# Patient Record
Sex: MALE | Race: BLACK OR AFRICAN AMERICAN | NOT HISPANIC OR LATINO | Employment: OTHER | URBAN - METROPOLITAN AREA
[De-identification: names, ages, dates, MRNs, and addresses within clinical notes are randomized per-mention and may not be internally consistent; named-entity substitution may affect disease eponyms.]

---

## 2019-06-09 ENCOUNTER — HOSPITAL ENCOUNTER (EMERGENCY)
Facility: OTHER | Age: 54
Discharge: HOME | End: 2019-06-09
Attending: EMERGENCY MEDICINE
Payer: MEDICAID

## 2019-06-09 VITALS
BODY MASS INDEX: 49.44 KG/M2 | HEART RATE: 60 BPM | HEIGHT: 67 IN | SYSTOLIC BLOOD PRESSURE: 137 MMHG | OXYGEN SATURATION: 99 % | TEMPERATURE: 99 F | DIASTOLIC BLOOD PRESSURE: 71 MMHG | RESPIRATION RATE: 18 BRPM | WEIGHT: 315 LBS

## 2019-06-09 DIAGNOSIS — R10.9 ABDOMINAL PAIN: ICD-10-CM

## 2019-06-09 DIAGNOSIS — K80.20 CALCULUS OF GALLBLADDER WITHOUT CHOLECYSTITIS WITHOUT OBSTRUCTION: Primary | ICD-10-CM

## 2019-06-09 LAB
ALBUMIN SERPL BCP-MCNC: 3.9 G/DL (ref 3.5–5.2)
ALP SERPL-CCNC: 80 U/L (ref 55–135)
ALT SERPL W/O P-5'-P-CCNC: 17 U/L (ref 10–44)
ANION GAP SERPL CALC-SCNC: 13 MMOL/L (ref 8–16)
AST SERPL-CCNC: 26 U/L (ref 10–40)
BASOPHILS # BLD AUTO: 0.02 K/UL (ref 0–0.2)
BASOPHILS NFR BLD: 0.3 % (ref 0–1.9)
BILIRUB SERPL-MCNC: 0.5 MG/DL (ref 0.1–1)
BILIRUB UR QL STRIP: NEGATIVE
BUN SERPL-MCNC: 29 MG/DL (ref 6–20)
CALCIUM SERPL-MCNC: 9.2 MG/DL (ref 8.7–10.5)
CHLORIDE SERPL-SCNC: 102 MMOL/L (ref 95–110)
CLARITY UR: CLEAR
CO2 SERPL-SCNC: 26 MMOL/L (ref 23–29)
COLOR UR: YELLOW
CREAT SERPL-MCNC: 2.2 MG/DL (ref 0.5–1.4)
DIFFERENTIAL METHOD: ABNORMAL
EOSINOPHIL # BLD AUTO: 0 K/UL (ref 0–0.5)
EOSINOPHIL NFR BLD: 0.5 % (ref 0–8)
ERYTHROCYTE [DISTWIDTH] IN BLOOD BY AUTOMATED COUNT: 15.9 % (ref 11.5–14.5)
EST. GFR  (AFRICAN AMERICAN): 39 ML/MIN/1.73 M^2
EST. GFR  (NON AFRICAN AMERICAN): 33 ML/MIN/1.73 M^2
GLUCOSE SERPL-MCNC: 95 MG/DL (ref 70–110)
GLUCOSE UR QL STRIP: ABNORMAL
HCT VFR BLD AUTO: 40.4 % (ref 40–54)
HGB BLD-MCNC: 13.1 G/DL (ref 14–18)
HGB UR QL STRIP: ABNORMAL
KETONES UR QL STRIP: NEGATIVE
LEUKOCYTE ESTERASE UR QL STRIP: NEGATIVE
LIPASE SERPL-CCNC: 23 U/L (ref 4–60)
LYMPHOCYTES # BLD AUTO: 1.5 K/UL (ref 1–4.8)
LYMPHOCYTES NFR BLD: 24.2 % (ref 18–48)
MCH RBC QN AUTO: 26.3 PG (ref 27–31)
MCHC RBC AUTO-ENTMCNC: 32.4 G/DL (ref 32–36)
MCV RBC AUTO: 81 FL (ref 82–98)
MONOCYTES # BLD AUTO: 0.3 K/UL (ref 0.3–1)
MONOCYTES NFR BLD: 4.5 % (ref 4–15)
NEUTROPHILS # BLD AUTO: 4.4 K/UL (ref 1.8–7.7)
NEUTROPHILS NFR BLD: 70.2 % (ref 38–73)
NITRITE UR QL STRIP: NEGATIVE
PH UR STRIP: 6 [PH] (ref 5–8)
PLATELET # BLD AUTO: 171 K/UL (ref 150–350)
PMV BLD AUTO: 11.5 FL (ref 9.2–12.9)
POTASSIUM SERPL-SCNC: 3.5 MMOL/L (ref 3.5–5.1)
PROT SERPL-MCNC: 8.4 G/DL (ref 6–8.4)
PROT UR QL STRIP: ABNORMAL
RBC # BLD AUTO: 4.98 M/UL (ref 4.6–6.2)
SODIUM SERPL-SCNC: 141 MMOL/L (ref 136–145)
SP GR UR STRIP: 1.01 (ref 1–1.03)
URN SPEC COLLECT METH UR: ABNORMAL
UROBILINOGEN UR STRIP-ACNC: NEGATIVE EU/DL
WBC # BLD AUTO: 6.25 K/UL (ref 3.9–12.7)

## 2019-06-09 PROCEDURE — 83690 ASSAY OF LIPASE: CPT

## 2019-06-09 PROCEDURE — 96372 THER/PROPH/DIAG INJ SC/IM: CPT

## 2019-06-09 PROCEDURE — 81003 URINALYSIS AUTO W/O SCOPE: CPT

## 2019-06-09 PROCEDURE — 63600175 PHARM REV CODE 636 W HCPCS: Performed by: EMERGENCY MEDICINE

## 2019-06-09 PROCEDURE — 85025 COMPLETE CBC W/AUTO DIFF WBC: CPT

## 2019-06-09 PROCEDURE — 80053 COMPREHEN METABOLIC PANEL: CPT

## 2019-06-09 PROCEDURE — 99284 EMERGENCY DEPT VISIT MOD MDM: CPT | Mod: 25

## 2019-06-09 RX ORDER — DICYCLOMINE HYDROCHLORIDE 10 MG/ML
20 INJECTION INTRAMUSCULAR
Status: COMPLETED | OUTPATIENT
Start: 2019-06-09 | End: 2019-06-09

## 2019-06-09 RX ORDER — HYDROCODONE BITARTRATE AND ACETAMINOPHEN 5; 325 MG/1; MG/1
1 TABLET ORAL EVERY 4 HOURS PRN
Qty: 18 TABLET | Refills: 0 | OUTPATIENT
Start: 2019-06-09 | End: 2019-09-02

## 2019-06-09 RX ORDER — DICYCLOMINE HYDROCHLORIDE 20 MG/1
20 TABLET ORAL 2 TIMES DAILY
Qty: 20 TABLET | Refills: 0 | Status: SHIPPED | OUTPATIENT
Start: 2019-06-09 | End: 2019-07-09

## 2019-06-09 RX ADMIN — DICYCLOMINE HYDROCHLORIDE 20 MG: 20 INJECTION, SOLUTION INTRAMUSCULAR at 04:06

## 2019-06-09 NOTE — ED TRIAGE NOTES
"Pt states that he has intermittent generalized abdomen pain that started yesterday. Pt describes pain stabbing and he rate his pain as 7/10. Pt states that he took stool softner this am for "cramps". Pt denies N/V/D, fever and chills. Pt in NAD.   "

## 2019-06-09 NOTE — ED PROVIDER NOTES
Encounter Date: 6/9/2019    SCRIBE #1 NOTE: IVanessa, am scribing for, and in the presence of, Dr. Wood.       History     Chief Complaint   Patient presents with    Abdominal Pain     Pt c/o intermittent generalized abdominal pain which started Friday night, subsided Saturday & returned during the night on Saturday. Pt also reports frequent BMs after taking stool softner 4:30 this am, denies diarrhea. Pt c/o pain worse after drinking fluids, has not eaten solid foods.      Time seen by provider: 3:43 PM    This is a 53 y.o. male who presents to the ED with complaint of bilateral, upper quadrant abdominal pain that began two days ago. Patient reports constant cramping that was not relieved with taking stool softeners. Patient reports pain worsens when laying down. Patient denies having trouble going to the bathroom. Patient denies fever, nausea, vomiting, diarrhea, constipation, or back pain. Patient denies any history of abdominal surgeries. Patient is currently seeing a kidney and heart specialist. Patient just seen kidney specialist and had a normal workup, but his lasix was increased from 40 mg to 80 mg.        Review of patient's allergies indicates:  No Known Allergies  Past Medical History:   Diagnosis Date    CHF (congestive heart failure)     Gout     Hypertension     Renal disorder     Stage 3 chronic kidney disease      History reviewed. No pertinent surgical history.  History reviewed. No pertinent family history.  Social History     Tobacco Use    Smoking status: Never Smoker   Substance Use Topics    Alcohol use: No    Drug use: No     Review of Systems   Constitutional: Negative for fever.   HENT: Negative for sore throat.    Respiratory: Negative for shortness of breath.    Cardiovascular: Negative for chest pain.   Gastrointestinal: Positive for abdominal pain. Negative for constipation, diarrhea, nausea and vomiting.   Genitourinary: Negative for difficulty urinating and dysuria.    Musculoskeletal: Negative for back pain.   Skin: Negative for rash.   Neurological: Negative for weakness.   Hematological: Does not bruise/bleed easily.       Physical Exam     Initial Vitals [06/09/19 1440]   BP Pulse Resp Temp SpO2   (!) 203/92 76 18 98.5 °F (36.9 °C) 97 %      MAP       --         Physical Exam    Nursing note and vitals reviewed.  Constitutional: He appears well-developed and well-nourished. He is not diaphoretic. No distress.   Obese.   HENT:   Head: Normocephalic and atraumatic.   Eyes: Conjunctivae and EOM are normal. No scleral icterus.   Neck: Normal range of motion. Neck supple.   Cardiovascular: Normal rate, regular rhythm and normal heart sounds. Exam reveals no gallop and no friction rub.    No murmur heard.  Pulmonary/Chest: Breath sounds normal. No respiratory distress. He has no wheezes. He has no rhonchi. He has no rales.   Abdominal: Soft. Bowel sounds are normal. He exhibits no distension. There is tenderness in the right upper quadrant. There is no rebound and no guarding.   Musculoskeletal: Normal range of motion. He exhibits edema. He exhibits no tenderness.   Edema in bilateral legs.   Lymphadenopathy:     He has no cervical adenopathy.   Neurological: He is alert and oriented to person, place, and time.   Skin: Skin is warm and dry. No rash noted. No erythema. No pallor.   Psychiatric: He has a normal mood and affect. His behavior is normal. Judgment and thought content normal.         ED Course   Procedures  Labs Reviewed   CBC W/ AUTO DIFFERENTIAL - Abnormal; Notable for the following components:       Result Value    Hemoglobin 13.1 (*)     Mean Corpuscular Volume 81 (*)     Mean Corpuscular Hemoglobin 26.3 (*)     RDW 15.9 (*)     All other components within normal limits   COMPREHENSIVE METABOLIC PANEL - Abnormal; Notable for the following components:    BUN, Bld 29 (*)     Creatinine 2.2 (*)     eGFR if  39 (*)     eGFR if non  33 (*)      All other components within normal limits   URINALYSIS, REFLEX TO URINE CULTURE - Abnormal; Notable for the following components:    Protein, UA Trace (*)     Glucose, UA Trace (*)     Occult Blood UA Trace (*)     All other components within normal limits    Narrative:     Preferred Collection Type->Urine, Clean Catch   LIPASE          Imaging Results          US Abdomen Limited (Gallbladder) (Final result)  Result time 06/09/19 18:24:49    Final result by Theresa Carrera MD (06/09/19 18:24:49)                 Impression:      Cholelithiasis with gallbladder wall thickening, however no convincing ultrasonographic evidence to suggest cholecystitis.      Electronically signed by: Theresa Carrera MD  Date:    06/09/2019  Time:    18:24             Narrative:    EXAMINATION:  US ABDOMEN LIMITED    CLINICAL HISTORY:  Unspecified abdominal pain    TECHNIQUE:  Limited ultrasound of the right upper quadrant of the abdomen was performed.    COMPARISON:  None.    FINDINGS:  Hepatic parenchyma is homogeneous without evidence for masses.  No intra- or extrahepatic biliary ductal dilatation. The common bile duct measures 0.6 cm.  The gallbladder demonstrates 2 non mobile stones, the larger of which measures 0.9 cm.  There is gallbladder wall thickening no evidence of gallbladder wall hyperemia pericholecystic fluid.  Sonographic Wahl's sign is negative.  The visualized portion of the pancreas appears normal.  No ascites.                                 Medical Decision Making:   Initial Assessment:   53-year-old male presents to the emergency department with diffuse upper abdominal pain which is worse on the right side.  The patient reports some worsening after eating or drinking.  Cannot correlate with specific types of food especially cannot correlate with fatty foods.  The patient has been compliant with his medication regimen including recent increased Lasix dosing.  He denies any fever chills. He has some upper  abdominal tenderness worse on the right side.  Differential Diagnosis:   GERD, intestinal spasm, gastroenteritis, gastritis, ulcer, cholecystitis, gallstones, pancreatitis, ileus, small bowel obstruction, appendicitis, constipation, intestinal gas pain.    Clinical Tests:   Lab Tests: Ordered and Reviewed  Radiological Study: Ordered and Reviewed  ED Management:  5:08 PM Patient reevaluated, concerns addressed and updated on status of testing and evaluation.     Patient improved with treatment in the emergency department and comfortable going home. Discussed reasons to return and importance of followup.  Patient understands that the emergency visit today is primarily to address immediate concerns and to rule out emergent cause of symptoms and that they may require further workup and evaluation as an outpatient. All questions addressed and patient given discharge instructions and followup information.  Advised follow-up with General surgery due to cholelithiasis.  There is no evidence of choledocholithiasis or cholecystitis at this time.  The patient was given precautions, verbalizes understanding reasons to return              Scribe Attestation:   Scribe #1: I performed the above scribed service and the documentation accurately describes the services I performed. I attest to the accuracy of the note.    Attending Attestation:           Physician Attestation for Scribe:  Physician Attestation Statement for Scribe #1: I, Dr. Wood, reviewed documentation, as scribed by Vanessa Campbell in my presence, and it is both accurate and complete.                    Clinical Impression:     1. Calculus of gallbladder without cholecystitis without obstruction    2. Abdominal pain                                   Soniya Wood MD  06/14/19 1257

## 2019-09-02 ENCOUNTER — HOSPITAL ENCOUNTER (EMERGENCY)
Facility: OTHER | Age: 54
Discharge: HOME | End: 2019-09-02
Attending: EMERGENCY MEDICINE
Payer: MEDICAID

## 2019-09-02 VITALS
DIASTOLIC BLOOD PRESSURE: 85 MMHG | HEART RATE: 59 BPM | RESPIRATION RATE: 15 BRPM | HEIGHT: 67 IN | BODY MASS INDEX: 49.12 KG/M2 | SYSTOLIC BLOOD PRESSURE: 184 MMHG | OXYGEN SATURATION: 100 % | WEIGHT: 313 LBS | TEMPERATURE: 99 F

## 2019-09-02 DIAGNOSIS — K80.50 BILIARY COLIC: Primary | ICD-10-CM

## 2019-09-02 DIAGNOSIS — N18.30 ACUTE WORSENING OF STAGE 3 CHRONIC KIDNEY DISEASE: Chronic | ICD-10-CM

## 2019-09-02 LAB
ALBUMIN SERPL BCP-MCNC: 3.7 G/DL (ref 3.5–5.2)
ALP SERPL-CCNC: 159 U/L (ref 55–135)
ALT SERPL W/O P-5'-P-CCNC: 232 U/L (ref 10–44)
ANION GAP SERPL CALC-SCNC: 11 MMOL/L (ref 8–16)
AST SERPL-CCNC: 311 U/L (ref 10–40)
BACTERIA #/AREA URNS HPF: ABNORMAL /HPF
BASOPHILS # BLD AUTO: 0.02 K/UL (ref 0–0.2)
BASOPHILS NFR BLD: 0.3 % (ref 0–1.9)
BILIRUB SERPL-MCNC: 1.1 MG/DL (ref 0.1–1)
BILIRUB UR QL STRIP: ABNORMAL
BUN SERPL-MCNC: 30 MG/DL (ref 6–20)
CALCIUM SERPL-MCNC: 9.4 MG/DL (ref 8.7–10.5)
CHLORIDE SERPL-SCNC: 101 MMOL/L (ref 95–110)
CLARITY UR: CLEAR
CO2 SERPL-SCNC: 28 MMOL/L (ref 23–29)
COLOR UR: ABNORMAL
CREAT SERPL-MCNC: 3.2 MG/DL (ref 0.5–1.4)
DIFFERENTIAL METHOD: ABNORMAL
EOSINOPHIL # BLD AUTO: 0 K/UL (ref 0–0.5)
EOSINOPHIL NFR BLD: 0 % (ref 0–8)
ERYTHROCYTE [DISTWIDTH] IN BLOOD BY AUTOMATED COUNT: 15.9 % (ref 11.5–14.5)
EST. GFR  (AFRICAN AMERICAN): 24 ML/MIN/1.73 M^2
EST. GFR  (NON AFRICAN AMERICAN): 21 ML/MIN/1.73 M^2
GLUCOSE SERPL-MCNC: 131 MG/DL (ref 70–110)
GLUCOSE UR QL STRIP: ABNORMAL
HCT VFR BLD AUTO: 38.7 % (ref 40–54)
HGB BLD-MCNC: 12.2 G/DL (ref 14–18)
HGB UR QL STRIP: ABNORMAL
HYALINE CASTS #/AREA URNS LPF: 10 /LPF
IMM GRANULOCYTES # BLD AUTO: 0.01 K/UL (ref 0–0.04)
IMM GRANULOCYTES NFR BLD AUTO: 0.2 % (ref 0–0.5)
KETONES UR QL STRIP: ABNORMAL
LEUKOCYTE ESTERASE UR QL STRIP: NEGATIVE
LIPASE SERPL-CCNC: 31 U/L (ref 4–60)
LYMPHOCYTES # BLD AUTO: 1.1 K/UL (ref 1–4.8)
LYMPHOCYTES NFR BLD: 18.7 % (ref 18–48)
MCH RBC QN AUTO: 26.6 PG (ref 27–31)
MCHC RBC AUTO-ENTMCNC: 31.5 G/DL (ref 32–36)
MCV RBC AUTO: 84 FL (ref 82–98)
MICROSCOPIC COMMENT: ABNORMAL
MONOCYTES # BLD AUTO: 0.3 K/UL (ref 0.3–1)
MONOCYTES NFR BLD: 5.2 % (ref 4–15)
NEUTROPHILS # BLD AUTO: 4.5 K/UL (ref 1.8–7.7)
NEUTROPHILS NFR BLD: 75.6 % (ref 38–73)
NITRITE UR QL STRIP: NEGATIVE
NRBC BLD-RTO: 0 /100 WBC
PH UR STRIP: 6 [PH] (ref 5–8)
PLATELET # BLD AUTO: 167 K/UL (ref 150–350)
PMV BLD AUTO: 11.8 FL (ref 9.2–12.9)
POTASSIUM SERPL-SCNC: 3.3 MMOL/L (ref 3.5–5.1)
PROT SERPL-MCNC: 8 G/DL (ref 6–8.4)
PROT UR QL STRIP: ABNORMAL
RBC # BLD AUTO: 4.59 M/UL (ref 4.6–6.2)
RBC #/AREA URNS HPF: 3 /HPF (ref 0–4)
SODIUM SERPL-SCNC: 140 MMOL/L (ref 136–145)
SP GR UR STRIP: 1.02 (ref 1–1.03)
SQUAMOUS #/AREA URNS HPF: 8 /HPF
URN SPEC COLLECT METH UR: ABNORMAL
UROBILINOGEN UR STRIP-ACNC: ABNORMAL EU/DL
WBC # BLD AUTO: 5.99 K/UL (ref 3.9–12.7)
WBC #/AREA URNS HPF: 3 /HPF (ref 0–5)
WBC CLUMPS URNS QL MICRO: ABNORMAL
YEAST URNS QL MICRO: ABNORMAL

## 2019-09-02 PROCEDURE — 96374 THER/PROPH/DIAG INJ IV PUSH: CPT

## 2019-09-02 PROCEDURE — 99284 EMERGENCY DEPT VISIT MOD MDM: CPT | Mod: 25

## 2019-09-02 PROCEDURE — 80053 COMPREHEN METABOLIC PANEL: CPT

## 2019-09-02 PROCEDURE — 25000003 PHARM REV CODE 250: Performed by: EMERGENCY MEDICINE

## 2019-09-02 PROCEDURE — 63600175 PHARM REV CODE 636 W HCPCS: Performed by: EMERGENCY MEDICINE

## 2019-09-02 PROCEDURE — 81000 URINALYSIS NONAUTO W/SCOPE: CPT

## 2019-09-02 PROCEDURE — 85025 COMPLETE CBC W/AUTO DIFF WBC: CPT

## 2019-09-02 PROCEDURE — 83690 ASSAY OF LIPASE: CPT

## 2019-09-02 RX ORDER — ENALAPRIL MALEATE 20 MG/1
10 TABLET ORAL DAILY
Qty: 30 TABLET | Status: ON HOLD
Start: 2019-09-02 | End: 2022-11-07

## 2019-09-02 RX ORDER — MORPHINE SULFATE 4 MG/ML
4 INJECTION, SOLUTION INTRAMUSCULAR; INTRAVENOUS
Status: COMPLETED | OUTPATIENT
Start: 2019-09-02 | End: 2019-09-02

## 2019-09-02 RX ORDER — FAMOTIDINE 20 MG/1
20 TABLET, FILM COATED ORAL
Status: COMPLETED | OUTPATIENT
Start: 2019-09-02 | End: 2019-09-02

## 2019-09-02 RX ORDER — OXYCODONE AND ACETAMINOPHEN 5; 325 MG/1; MG/1
1 TABLET ORAL EVERY 6 HOURS PRN
Qty: 12 TABLET | Refills: 0 | Status: SHIPPED | OUTPATIENT
Start: 2019-09-02 | End: 2022-06-04

## 2019-09-02 RX ORDER — AMLODIPINE BESYLATE 5 MG/1
5 TABLET ORAL DAILY
Qty: 30 TABLET | Refills: 0
Start: 2019-09-02 | End: 2019-09-02 | Stop reason: SDUPTHER

## 2019-09-02 RX ORDER — DICYCLOMINE HYDROCHLORIDE 10 MG/1
20 CAPSULE ORAL
Status: COMPLETED | OUTPATIENT
Start: 2019-09-02 | End: 2019-09-02

## 2019-09-02 RX ORDER — AMLODIPINE BESYLATE 5 MG/1
5 TABLET ORAL DAILY
Qty: 30 TABLET | Refills: 0 | Status: ON HOLD | OUTPATIENT
Start: 2019-09-02 | End: 2022-11-07

## 2019-09-02 RX ADMIN — DICYCLOMINE HYDROCHLORIDE 20 MG: 10 CAPSULE ORAL at 01:09

## 2019-09-02 RX ADMIN — MORPHINE SULFATE 4 MG: 4 INJECTION INTRAVENOUS at 01:09

## 2019-09-02 RX ADMIN — FAMOTIDINE 20 MG: 20 TABLET, FILM COATED ORAL at 01:09

## 2019-09-02 NOTE — ED TRIAGE NOTES
Pt presents to ED with c/o upper abdominal pain. Reports it as an intermittent sharp stabbing pain. States he was seen previously for this in June and was told it was his gallbladder, but didn't follow up because the pain went away. Denies N/V/D, fever, chest pain, dysuria, and SOB

## 2019-09-02 NOTE — ED PROVIDER NOTES
Encounter Date: 9/2/2019    SCRIBE #1 NOTE: I, Link Huerta, am scribing for, and in the presence of, Dr. Woodard.       History     Chief Complaint   Patient presents with    Abdominal Pain     seen here in June and supposed to follow up with surgery to have gallbladder removed, but felt better so did not follow up, now he is hurting and would like to have it taken out now     Time seen by provider: 1:26 AM    This is a 54 y.o. male with a history of CHF, HTN, and CKD who presents with complaint of upper abdominal pain that began approximately three days ago. The pain is described as an intermittent sharp pain. The patient was evaluated on 06/09/19 and was diagnosed with gallstones and was told to follow up with general surgery. The patient has had three follow up appointments with general surgery, but the patient has not had surgrery yet secondary to the pain going away. Last night the pain began a few hours after eating a tuna fish sandwich and is more constant now. He denies fever, chills, sore throat, chest pain, shortness of breath, nausea, vomiting, and dysuria. He states that his cardiologist even signed off on him having surgery and to stop taking his Eliquis for two days.  He is compliant with all of his medication, but he didn't take his fluid pills secondary to his symptoms.     The history is provided by the patient.     Review of patient's allergies indicates:  No Known Allergies  Past Medical History:   Diagnosis Date    CHF (congestive heart failure)     Gout     Hypertension     Renal disorder     Stage 3 chronic kidney disease      History reviewed. No pertinent surgical history.  History reviewed. No pertinent family history.  Social History     Tobacco Use    Smoking status: Never Smoker   Substance Use Topics    Alcohol use: No    Drug use: No     Review of Systems   Constitutional: Negative for chills and fever.   HENT: Negative for sore throat.    Eyes: Negative for redness.    Respiratory: Negative for shortness of breath.    Cardiovascular: Negative for chest pain.   Gastrointestinal: Positive for abdominal pain (sharp).   Genitourinary: Negative for dysuria.   Skin: Negative for rash.   Neurological: Negative for headaches.   Psychiatric/Behavioral: Negative for confusion.       Physical Exam     Initial Vitals [09/02/19 0109]   BP Pulse Resp Temp SpO2   (!) 185/87 76 18 98.5 °F (36.9 °C) 98 %      MAP       --         Physical Exam    Nursing note and vitals reviewed.  Constitutional: He appears well-developed and well-nourished.   Obese.    HENT:   Head: Normocephalic and atraumatic.   Mouth/Throat: Oropharynx is clear and moist.   Eyes: Conjunctivae and EOM are normal. Pupils are equal, round, and reactive to light.   Neck: Normal range of motion. Neck supple.   Cardiovascular: Normal rate, normal heart sounds and normal pulses. An irregularly irregular rhythm present.    No murmur heard.  Pulmonary/Chest: Breath sounds normal. No respiratory distress. He has no wheezes. He has no rhonchi. He has no rales.   Abdominal: Soft. There is tenderness in the right upper quadrant and epigastric area. There is no rebound and no guarding.   Musculoskeletal: Normal range of motion. He exhibits edema. He exhibits no tenderness.   Chronic 1+ edema.    Neurological: He is alert and oriented to person, place, and time. He has normal strength. No cranial nerve deficit.   Skin: Skin is warm and dry.   Psychiatric: He has a normal mood and affect. His behavior is normal. Thought content normal.         ED Course   Procedures  Labs Reviewed   COMPREHENSIVE METABOLIC PANEL - Abnormal; Notable for the following components:       Result Value    Potassium 3.3 (*)     Glucose 131 (*)     BUN, Bld 30 (*)     Creatinine 3.2 (*)     Total Bilirubin 1.1 (*)     Alkaline Phosphatase 159 (*)      (*)      (*)     eGFR if  24 (*)     eGFR if non  21 (*)     All  other components within normal limits   CBC W/ AUTO DIFFERENTIAL - Abnormal; Notable for the following components:    RBC 4.59 (*)     Hemoglobin 12.2 (*)     Hematocrit 38.7 (*)     Mean Corpuscular Hemoglobin 26.6 (*)     Mean Corpuscular Hemoglobin Conc 31.5 (*)     RDW 15.9 (*)     Gran% 75.6 (*)     All other components within normal limits   URINALYSIS, REFLEX TO URINE CULTURE - Abnormal; Notable for the following components:    Protein, UA 2+ (*)     Glucose, UA Trace (*)     Ketones, UA Trace (*)     Bilirubin (UA) 1+ (*)     Occult Blood UA Trace (*)     Urobilinogen, UA 4.0-6.0 (*)     All other components within normal limits    Narrative:     Preferred Collection Type->Urine, Clean Catch   URINALYSIS MICROSCOPIC - Abnormal; Notable for the following components:    Hyaline Casts, UA 10 (*)     All other components within normal limits    Narrative:     Preferred Collection Type->Urine, Clean Catch   LIPASE          Imaging Results          US Abdomen Limited (Final result)  Result time 09/02/19 02:55:29    Final result by Porsha Jenkins MD (09/02/19 02:55:29)                 Impression:      1. Cholelithiasis with slight gallbladder wall thickening.  No definite sonographic evidence of acute cholecystitis at this time.  However, further evaluation with HIDA scan can be performed as clinically warranted.  2. Findings suggestive of hepatic steatosis.  Correlation with LFTs advised.      Electronically signed by: Porsha Jenkins MD  Date:    09/02/2019  Time:    02:55             Narrative:    EXAMINATION:  US ABDOMEN LIMITED    CLINICAL HISTORY:  RUQ pain;    TECHNIQUE:  Limited ultrasound of the right upper quadrant of the abdomen (including pancreas, liver, gallbladder, common bile duct, and right kidney) was performed.    COMPARISON:  Abdominal ultrasound 06/09/2019    FINDINGS:  Please note this is a technically limited examination secondary to bowel gas artifact and patient body habitus.    Pancreas:  Obscured by overlying bowel gas.    IVC: Unremarkable in its visualized extent    Liver: The liver measures 15.7 cm.  There is diffuse attenuation of sound by the liver consistent with a diffuse parenchymal process such as fatty infiltration.  No focal hepatic abnormality.    Gallbladder: The gallbladder is contracted and contains multiple stones, the largest of which measures 1.4 cm.  The gallbladder wall is slightly thickened measuring 0.4 cm.  Sonographic Wahl sign is reported to be negative by the ultrasound technologist.    Biliary system: The common duct is not dilated, measuring 3 mm.  No intrahepatic ductal dilatation.    Spleen: The spleen is normal in size measuring 10 cm.    Miscellaneous: No upper abdominal ascites.                                 Medical Decision Making:   Initial Assessment:       54-year-old male with history of CHF, HTN, CKD, AFib presents with recurrent upper abdominal pain that started 3 days ago and became more constant since eating dinner last night.  He had similar pain when seen here almost 3 months ago and diagnosed with biliary colic.  He has seen a surgeon Dr. Crowley since then and was advised to have surgery, and got clearance from his cardiologist to stop Eliquis for 2 days for surgery, but did not proceed since symptoms stopped in the past month.  Pain is described as sharp epigastric and right upper quadrant pain, usually about an hour after eating.  He ran out of pain meds Rx on last ED visit.  No associated nausea, fevers, or other new complaints.   On exam patient with epigastric and right upper quadrant tenderness but negative Wahl's, no fever and no other concerning findings.  He has chronic lower extremity edema that he states is improved from baseline; he has not taking Lasix yet today but no sign of CHF exacerbation on exam.       Initial labs concerning for elevated LFTs that were not present 3 months ago, with TB 1.1 and  and .  He also has  acute on chronic kidney injury with CR 3.2 with previous baseline 2.2.  No elevated WBC. Right upper quadrant ultrasound shows multiple gallstones, largest 1.4 cm, with slight gallbladder wall thickening but no CBD dilation and negative sonographic Wahl sign; findings not definite for acute cholecystitis.  Patient also has fatty liver, which may be related to elevated LFTs, since no sign of biliary obstruction.  On last cardiology visit his Norvasc was stopped due to side effect of lower extremity edema, and enalapril double 10 mg to 20 mg.  Discussed with Dr. Pratt, covering for his nephrologist Dr. Hogan, who believes this increase in enalapril is most likely responsible for worsening CKD, unless another etiology like fluid overload, dehydration, or sepsis present.  No evidence of CHF, and patient states he has been eating normally despite pain, and no sign of acute cholecystitis.  I advised admission for observation of pain to ensure no developing acute cholecystitis, and renal function after decreasing enalapril back to 10 mg daily. After IV pain control he feels improved, and wants admission only if his gallbladder will be taken out during admission.  Discussed with Dr. Head, surgery on-call, states that patient will likely need optimization of renal function and be off of Eliquis for 2 days prior to any possible cholecystectomy.  He has appointment with Nephrology and 3 days and with his surgeon in 4 days, and prefers trial of outpatient management.  I had extensive discussion with him about risks of outpatient management and he states he understands and will return for any worsening symptoms. Will discharge with pain meds, and patient advised to keep appointments and return to the ED for any worsening pain or other new symptoms such as fevers, SOB, CP,, vomiting, or any other concerns.      Clinical Tests:   Lab Tests: Ordered and Reviewed  Radiological Study: Ordered and Reviewed            Scribe  Attestation:   Scribe #1: I performed the above scribed service and the documentation accurately describes the services I performed. I attest to the accuracy of the note.    Attending Attestation:           Physician Attestation for Scribe:  Physician Attestation Statement for Scribe #1: I, Dr. Woodard, reviewed documentation, as scribed by Link Huerta  in my presence, and it is both accurate and complete.                    Clinical Impression:     1. Biliary colic    2. Acute worsening of stage 3 chronic kidney disease                                   Asa Woodard MD  09/02/19 2013

## 2022-06-04 ENCOUNTER — HOSPITAL ENCOUNTER (EMERGENCY)
Facility: OTHER | Age: 57
Discharge: HOME | End: 2022-06-04
Attending: EMERGENCY MEDICINE
Payer: MEDICAID

## 2022-06-04 VITALS
BODY MASS INDEX: 42.53 KG/M2 | DIASTOLIC BLOOD PRESSURE: 83 MMHG | OXYGEN SATURATION: 100 % | WEIGHT: 271 LBS | RESPIRATION RATE: 18 BRPM | SYSTOLIC BLOOD PRESSURE: 145 MMHG | HEART RATE: 79 BPM | TEMPERATURE: 99 F | HEIGHT: 67 IN

## 2022-06-04 DIAGNOSIS — M25.569 KNEE PAIN: ICD-10-CM

## 2022-06-04 DIAGNOSIS — M71.162 SEPTIC PREPATELLAR BURSITIS OF LEFT KNEE: Primary | ICD-10-CM

## 2022-06-04 PROCEDURE — 25000003 PHARM REV CODE 250: Performed by: PHYSICIAN ASSISTANT

## 2022-06-04 PROCEDURE — 99283 EMERGENCY DEPT VISIT LOW MDM: CPT | Mod: 25

## 2022-06-04 RX ORDER — CEPHALEXIN 500 MG/1
500 CAPSULE ORAL EVERY 6 HOURS
Qty: 28 CAPSULE | Refills: 0 | Status: SHIPPED | OUTPATIENT
Start: 2022-06-04 | End: 2022-06-11

## 2022-06-04 RX ORDER — HYDROCODONE BITARTRATE AND ACETAMINOPHEN 5; 325 MG/1; MG/1
1 TABLET ORAL
Status: COMPLETED | OUTPATIENT
Start: 2022-06-04 | End: 2022-06-04

## 2022-06-04 RX ORDER — LIDOCAINE HYDROCHLORIDE 20 MG/ML
5 INJECTION, SOLUTION INFILTRATION; PERINEURAL
Status: COMPLETED | OUTPATIENT
Start: 2022-06-04 | End: 2022-06-04

## 2022-06-04 RX ORDER — HYDROCODONE BITARTRATE AND ACETAMINOPHEN 5; 325 MG/1; MG/1
1 TABLET ORAL EVERY 4 HOURS PRN
Qty: 8 TABLET | Refills: 0 | Status: SHIPPED | OUTPATIENT
Start: 2022-06-04 | End: 2022-06-06

## 2022-06-04 RX ORDER — HYDROCODONE BITARTRATE AND ACETAMINOPHEN 5; 325 MG/1; MG/1
1 TABLET ORAL
Status: DISCONTINUED | OUTPATIENT
Start: 2022-06-04 | End: 2022-06-04

## 2022-06-04 RX ORDER — CEPHALEXIN 500 MG/1
500 CAPSULE ORAL
Status: COMPLETED | OUTPATIENT
Start: 2022-06-04 | End: 2022-06-04

## 2022-06-04 RX ADMIN — HYDROCODONE BITARTRATE AND ACETAMINOPHEN 1 TABLET: 5; 325 TABLET ORAL at 07:06

## 2022-06-04 RX ADMIN — CEPHALEXIN 500 MG: 500 CAPSULE ORAL at 07:06

## 2022-06-04 RX ADMIN — LIDOCAINE HYDROCHLORIDE 5 ML: 20 INJECTION, SOLUTION INFILTRATION; PERINEURAL at 05:06

## 2022-06-04 NOTE — ED TRIAGE NOTES
Chief Complaint   Patient presents with    Knee Pain     Pt to ER with c/o left knee pain and swelling. Pt reports tripped and fell on Friday and seen at [facility] ER and was told nothing was broken. Pt reports swelling still there and pain has not resolved.      Pt presents to ED after a fall on 6/3. Pt was seen at [facility] ER and was told nothing was broken. Pt reports pain and swelling has not resolved with RICE. Left knee is slightly larger than right knee and visibly swollen.

## 2022-06-04 NOTE — ED PROVIDER NOTES
Encounter Date: 6/4/2022       History     Chief Complaint   Patient presents with    Knee Pain     Pt to ER with c/o left knee pain and swelling. Pt reports tripped and fell on Friday and seen at [facility] ER and was told nothing was broken. Pt reports swelling still there and pain has not resolved.      Patient presents with complaints of left knee pain and swelling. He reports that he tripped and fell one week ago and fell onto his left knee. He was seen at [facility] ER and was told there were no fractures or dislocations. He was discharged with instruction to wear an ACE, elevated, apply cool compresses. He reports that he has been doing this but admits that the keen continued to swell and is more painful than before. He is able to walk but admits that there is a throbbing sensation just below the left knee. There are no open wounds.   Of note, he is taking apixaban daily.         Review of patient's allergies indicates:  No Known Allergies  Past Medical History:   Diagnosis Date    CHF (congestive heart failure)     Gout     Hypertension     Renal disorder     Stage 3 chronic kidney disease      No past surgical history on file.  No family history on file.  Social History     Tobacco Use    Smoking status: Never Smoker   Substance Use Topics    Alcohol use: No    Drug use: No     Review of Systems   Constitutional: Negative for chills and fever.   HENT: Negative for sore throat and trouble swallowing.    Eyes: Negative for visual disturbance.   Respiratory: Negative for cough and shortness of breath.    Cardiovascular: Negative for chest pain.   Gastrointestinal: Negative for abdominal pain, constipation, diarrhea, nausea and vomiting.   Genitourinary: Negative for dysuria and flank pain.   Musculoskeletal: Negative for back pain, neck pain and neck stiffness.        Left knee pain and swelling    Skin: Negative for rash.   Neurological: Negative for dizziness, syncope, weakness and headaches.    Psychiatric/Behavioral: Negative for confusion.       Physical Exam     Initial Vitals [06/04/22 1456]   BP Pulse Resp Temp SpO2   (!) 143/91 75 18 98.6 °F (37 °C) 100 %      MAP       --         Physical Exam    Nursing note and vitals reviewed.  Constitutional:   Elderly male in NAD or apparent pain. He makes good eye contact, speaks in clear full sentences and ambulates with mildly antalgic gait.    HENT:   Head: Normocephalic and atraumatic.   Eyes: EOM are normal. Pupils are equal, round, and reactive to light.   Neck:   Normal range of motion.  Pulmonary/Chest: No respiratory distress.   Abdominal: Abdomen is soft.   Musculoskeletal:         General: Tenderness and edema present. Normal range of motion.      Cervical back: Normal range of motion.      Comments: Left knee is warm to touch, edematous and has significant ecchymosis noted to both thigh and calf. There is no thigh or calf TTP. There is only TTP to the prepatellar bursa.      Neurological: He is alert and oriented to person, place, and time. He has normal strength.   Skin: Skin is warm and dry. Capillary refill takes less than 2 seconds.   Psychiatric: He has a normal mood and affect. His behavior is normal. Thought content normal.         ED Course   Procedures  Labs Reviewed - No data to display       Imaging Results          X-Ray Knee 1 or 2 View Left (Final result)  Result time 06/04/22 16:29:00    Final result by Carmine Moura MD (06/04/22 16:29:00)                 Impression:      No evidence of fracture.Prepatellar soft tissue swelling may be that of hematoma or prepatellar bursitis.      Electronically signed by: Carmine Moura MD  Date:    06/04/2022  Time:    16:29             Narrative:    EXAMINATION:  XR KNEE 1 OR 2 VIEW LEFT    CLINICAL HISTORY:  Pain in unspecified knee    COMPARISON:  None.    FINDINGS:  The alignment is within normal limits.  No displaced fractures identified.  No evidence of lytic or blastic lesions.Joint  spaces are unremarkable.Prepatellar soft tissue swelling potentially hematoma or prepatellar bursitis.                                 Medications   LIDOcaine HCL 20 mg/ml (2%) injection 5 mL (5 mLs Intradermal Given by Other 6/4/22 1746)   cephALEXin capsule 500 mg (500 mg Oral Given 6/4/22 1945)   HYDROcodone-acetaminophen 5-325 mg per tablet 1 tablet (1 tablet Oral Given 6/4/22 1945)     Medical Decision Making:   ED Management:  Urgent evaluation a 56-year-old male who presents with complaints most consistent with left-sided septic bursitis.  He is afebrile, nontoxic appearing, hemodynamically stable.  Physical exam outlined above and reveals exquisitely tender and swollen prepatellar bursa on the left lower extremity with no open wounds.  There is associated ecchymosis and warmth to touch.  There is good range of motion so low suspicion for intra-articular infection.  X-ray does confirm possible hematoma in the prepatellar bursa.  Given its warmth, tenderness and erythema I attempted to aspirate fluid from the bursa sac.  Was unable to obtain any significant volume of fluid only scant dark red blood expect that sac is filled with clotted blood product.  No specimens obtained for analysis.  Will place patient on Keflex empirically 1st dose given here in the emergency department.  Knee is wrapped with Ace wrap and patient is encouraged to return to this emergency department in 2 days for wound recheck.  He is educated on the importance of follow-up with ortho as well as in this department.  He is also where the importance of antibiotic compliance.  He verbalizes understanding of plan as well as ED return precautions.  He is stable for discharge.                      Clinical Impression:   Final diagnoses:  [M25.569] Knee pain  [M71.162] Septic prepatellar bursitis of left knee (Primary)          ED Disposition Condition    Discharge Good        ED Prescriptions     Medication Sig Dispense Start Date End Date  Auth. Provider    cephALEXin (KEFLEX) 500 MG capsule Take 1 capsule (500 mg total) by mouth every 6 (six) hours. for 7 days 28 capsule 6/4/2022 6/11/2022 Jackie Weinberg PA-C    HYDROcodone-acetaminophen (NORCO) 5-325 mg per tablet Take 1 tablet by mouth every 4 (four) hours as needed for Pain. 8 tablet 6/4/2022 6/6/2022 Jackie Weinberg PA-C        Follow-up Information     Follow up With Specialties Details Why Contact Info    [facility] - Emergency Dept Emergency Medicine In 2 days For symptom re-check [address] [facility] [id number] [phone]    Daniel Grewal MD Orthopedic Surgery In 2 days For wound re-check [address] [facility] [address]  [facility] [address]  [phone]             Jackie Weinberg PA-C  06/04/22 6005

## 2022-06-06 ENCOUNTER — HOSPITAL ENCOUNTER (EMERGENCY)
Facility: OTHER | Age: 57
Discharge: HOME | End: 2022-06-06
Attending: EMERGENCY MEDICINE
Payer: MEDICAID

## 2022-06-06 VITALS
WEIGHT: 271 LBS | SYSTOLIC BLOOD PRESSURE: 120 MMHG | HEART RATE: 80 BPM | RESPIRATION RATE: 18 BRPM | HEIGHT: 67 IN | OXYGEN SATURATION: 100 % | TEMPERATURE: 98 F | DIASTOLIC BLOOD PRESSURE: 70 MMHG | BODY MASS INDEX: 42.53 KG/M2

## 2022-06-06 DIAGNOSIS — M70.42 PREPATELLAR BURSITIS, LEFT KNEE: Primary | ICD-10-CM

## 2022-06-06 DIAGNOSIS — Z79.01 ON CONTINUOUS ORAL ANTICOAGULATION: ICD-10-CM

## 2022-06-06 DIAGNOSIS — S80.12XA TRAUMATIC ECCHYMOSIS OF LEFT LOWER LEG, INITIAL ENCOUNTER: ICD-10-CM

## 2022-06-06 PROCEDURE — 99283 EMERGENCY DEPT VISIT LOW MDM: CPT

## 2022-06-06 PROCEDURE — 25000003 PHARM REV CODE 250: Performed by: EMERGENCY MEDICINE

## 2022-06-06 RX ORDER — OXYCODONE AND ACETAMINOPHEN 5; 325 MG/1; MG/1
1 TABLET ORAL EVERY 4 HOURS PRN
Qty: 14 TABLET | Refills: 0 | Status: ON HOLD | OUTPATIENT
Start: 2022-06-06 | End: 2022-11-07

## 2022-06-06 RX ORDER — OXYCODONE AND ACETAMINOPHEN 5; 325 MG/1; MG/1
2 TABLET ORAL
Status: COMPLETED | OUTPATIENT
Start: 2022-06-06 | End: 2022-06-06

## 2022-06-06 RX ADMIN — OXYCODONE HYDROCHLORIDE AND ACETAMINOPHEN 2 TABLET: 5; 325 TABLET ORAL at 07:06

## 2022-06-06 NOTE — FIRST PROVIDER EVALUATION
"Medical screening exam completed.  I have conducted a focused provider triage encounter, findings are as follows:    Brief history of present illness:  Diagnosed with septic prepatellar bursitis two days ago. Sent home on pain medication and antibiotics. Here for recheck. Reports pain with ambulation. Ortho follow up scheduled for Friday.     Vitals:    06/06/22 1534   BP: 129/65   BP Location: Left arm   Patient Position: Sitting   Pulse: 90   Resp: 18   Temp: 98.1 °F (36.7 °C)   TempSrc: Oral   SpO2: 98%   Weight: 122.9 kg (271 lb)   Height: 5' 7" (1.702 m)       Pertinent physical exam:  Swelling with dressing noted to the L knee.     Brief workup plan:  Defer to provider once roomed.     Preliminary workup initiated; this workup will be continued and followed by the physician or advanced practice provider that is assigned to the patient when roomed.  "

## 2022-06-06 NOTE — ED PROVIDER NOTES
Encounter Date: 6/6/2022    SCRIBE #1 NOTE: I, Brianne Laboy, am scribing for, and in the presence of, Eliu Carrera II, MD.       History     Chief Complaint   Patient presents with    recheck knee     Pt was told to come back to recheck left knee. Pt was seen here Saturday for septic joint. Pt has swelling noted to left knee. AAO x 3 nadn skin w.d  pt has appt with ortho on Friday      Time seen by provider: 6:48 PM    This is a 56 y.o. male who presents per doctor's orders to get his left knee checked for signs of sepsis. The patient was seen on 6/4/2022 for left knee pain and swelling and reports he is still experiencing the same pain. He reports taking the antibiotics as prescribed but feeling no different. He notes ecchymosis around left knee. The patient describes the pain medication was effective, though he has a small amount. Of note the patient is on Eliquis. He states he has an appointment with his orthopedic doctor on 6/10/2022.    The history is provided by the patient.     Review of patient's allergies indicates:  No Known Allergies  Past Medical History:   Diagnosis Date    CHF (congestive heart failure)     Gout     Hypertension     Renal disorder     Stage 3 chronic kidney disease      History reviewed. No pertinent surgical history.  History reviewed. No pertinent family history.  Social History     Tobacco Use    Smoking status: Never Smoker   Substance Use Topics    Alcohol use: No    Drug use: No     Review of Systems   Constitutional: Negative for fever.   HENT: Negative for sore throat.    Respiratory: Negative for shortness of breath.    Cardiovascular: Negative for chest pain.   Gastrointestinal: Negative for nausea.   Genitourinary: Negative for dysuria.   Musculoskeletal: Negative for back pain.        Positive for left knee pain.   Skin: Negative for rash.        Positive for ecchymosis around left knee.   Neurological: Negative for weakness.   Hematological: Does not  bruise/bleed easily.       Physical Exam     Initial Vitals [06/06/22 1534]   BP Pulse Resp Temp SpO2   129/65 90 18 98.1 °F (36.7 °C) 98 %      MAP       --         Physical Exam    Nursing note and vitals reviewed.  Constitutional: He appears well-developed and well-nourished. He is not diaphoretic. No distress.   HENT:   Head: Normocephalic and atraumatic.   Eyes: EOM are normal.   Neck:   Normal range of motion.  Musculoskeletal:         General: Normal range of motion.      Cervical back: Normal range of motion.      Comments: Left knee with continued ecchymosis wrapping around his thigh which extends proximally and distally. Similar in distribution to prior exam. Warm but no erythema over patella. Tender prepatellar swelling. Pain with but able to perform near normal ROM. NVID.     Neurological: He is alert and oriented to person, place, and time.   Skin: Skin is dry.         ED Course   Procedures  Labs Reviewed - No data to display       Imaging Results    None          Medications   oxyCODONE-acetaminophen 5-325 mg per tablet 2 tablet (2 tablets Oral Given 6/6/22 1931)     Medical Decision Making:   History:   Old Medical Records: I decided to obtain old medical records.  pt returns for wound check of prepatellar bursitis.  Extent of ecchymosis is similar to my prior eval.  Remains warm w/out erythema.  Pain is only limited at full flexion, likely due to the bursitis. Clinical picture and exam don't suggest septic jt. Continue abx.  Reports little reliev w/ vicodin - will chage to percocet.  Has f/u appt scheduled for 4d from now w/ dr nessa Jameson Attestation:   Scribe #1: I performed the above scribed service and the documentation accurately describes the services I performed. I attest to the accuracy of the note.               Physician Attestation for Scribe: I, TLH, reviewed documentation as scribed in my presence, which is both accurate and complete.  Clinical Impression:   Final  diagnoses:  [M70.42] Prepatellar bursitis, left knee (Primary)  [S80.12XA] Traumatic ecchymosis of left lower leg, initial encounter  [Z79.01] On continuous oral anticoagulation          ED Disposition Condition    Discharge Stable        ED Prescriptions     Medication Sig Dispense Start Date End Date Auth. Provider    oxyCODONE-acetaminophen (PERCOCET) 5-325 mg per tablet Take 1 tablet by mouth every 4 (four) hours as needed for Pain. 14 tablet 6/6/2022  Eliu Carrera II, MD        Follow-up Information     Follow up With Specialties Details Why Contact Info    Daniel Grewal MD Orthopedic Surgery In 4 days as scheduled [address] [facility]  [address]  [facility] [address]  [phone]             Eliu Carrera II, MD  06/07/22 6994

## 2022-11-07 ENCOUNTER — HOSPITAL ENCOUNTER (INPATIENT)
Facility: OTHER | Age: 57
LOS: 2 days | Discharge: HOME | DRG: 683 | End: 2022-11-09
Attending: EMERGENCY MEDICINE | Admitting: HOSPITALIST
Payer: MEDICAID

## 2022-11-07 DIAGNOSIS — I48.91 ATRIAL FIBRILLATION WITH RVR: ICD-10-CM

## 2022-11-07 DIAGNOSIS — I50.9 CONGESTIVE HEART FAILURE, UNSPECIFIED: ICD-10-CM

## 2022-11-07 DIAGNOSIS — R07.9 CHEST PAIN: ICD-10-CM

## 2022-11-07 DIAGNOSIS — M10.9 GOUT: ICD-10-CM

## 2022-11-07 DIAGNOSIS — R06.02 SOB (SHORTNESS OF BREATH): ICD-10-CM

## 2022-11-07 DIAGNOSIS — E87.6 HYPOKALEMIA: ICD-10-CM

## 2022-11-07 DIAGNOSIS — I50.42: ICD-10-CM

## 2022-11-07 DIAGNOSIS — I50.43: ICD-10-CM

## 2022-11-07 DIAGNOSIS — I50.9 CHF EXACERBATION: ICD-10-CM

## 2022-11-07 DIAGNOSIS — I50.43 ACUTE ON CHRONIC COMBINED SYSTOLIC AND DIASTOLIC HEART FAILURE, NYHA CLASS 2: ICD-10-CM

## 2022-11-07 DIAGNOSIS — I10 HTN (HYPERTENSION): ICD-10-CM

## 2022-11-07 DIAGNOSIS — N18.30: ICD-10-CM

## 2022-11-07 DIAGNOSIS — I50.21: ICD-10-CM

## 2022-11-07 DIAGNOSIS — N17.9 AKI (ACUTE KIDNEY INJURY): Primary | ICD-10-CM

## 2022-11-07 PROBLEM — D63.1 ANEMIA OF CHRONIC KIDNEY FAILURE, STAGE 4 (SEVERE): Status: ACTIVE | Noted: 2020-03-11

## 2022-11-07 PROBLEM — Z79.01 CHRONIC ANTICOAGULATION: Status: ACTIVE | Noted: 2020-02-21

## 2022-11-07 PROBLEM — N18.4 ANEMIA OF CHRONIC KIDNEY FAILURE, STAGE 4 (SEVERE): Status: ACTIVE | Noted: 2020-03-11

## 2022-11-07 PROBLEM — R60.0 EDEMA OF BOTH LEGS: Status: ACTIVE | Noted: 2020-02-22

## 2022-11-07 LAB
ALBUMIN SERPL BCP-MCNC: 3.6 G/DL (ref 3.5–5.2)
ALP SERPL-CCNC: 87 U/L (ref 55–135)
ALT SERPL W/O P-5'-P-CCNC: 16 U/L (ref 10–44)
ANION GAP SERPL CALC-SCNC: 12 MMOL/L (ref 8–16)
ANION GAP SERPL CALC-SCNC: 13 MMOL/L (ref 8–16)
ANION GAP SERPL CALC-SCNC: 15 MMOL/L (ref 8–16)
ANISOCYTOSIS BLD QL SMEAR: SLIGHT
AST SERPL-CCNC: 16 U/L (ref 10–40)
BASOPHILS # BLD AUTO: 0.04 K/UL (ref 0–0.2)
BASOPHILS NFR BLD: 0.9 % (ref 0–1.9)
BILIRUB SERPL-MCNC: 0.9 MG/DL (ref 0.1–1)
BILIRUB UR QL STRIP: NEGATIVE
BUN SERPL-MCNC: 47 MG/DL (ref 6–20)
BUN SERPL-MCNC: 48 MG/DL (ref 6–20)
BUN SERPL-MCNC: 50 MG/DL (ref 6–20)
CALCIUM SERPL-MCNC: 9.2 MG/DL (ref 8.7–10.5)
CALCIUM SERPL-MCNC: 9.4 MG/DL (ref 8.7–10.5)
CALCIUM SERPL-MCNC: 9.8 MG/DL (ref 8.7–10.5)
CHLORIDE SERPL-SCNC: 93 MMOL/L (ref 95–110)
CHLORIDE SERPL-SCNC: 94 MMOL/L (ref 95–110)
CHLORIDE SERPL-SCNC: 96 MMOL/L (ref 95–110)
CLARITY UR: CLEAR
CO2 SERPL-SCNC: 34 MMOL/L (ref 23–29)
CO2 SERPL-SCNC: 34 MMOL/L (ref 23–29)
CO2 SERPL-SCNC: 36 MMOL/L (ref 23–29)
COLOR UR: YELLOW
CREAT SERPL-MCNC: 3.4 MG/DL (ref 0.5–1.4)
CREAT SERPL-MCNC: 3.5 MG/DL (ref 0.5–1.4)
CREAT SERPL-MCNC: 3.7 MG/DL (ref 0.5–1.4)
CTP QC/QA: YES
DACRYOCYTES BLD QL SMEAR: ABNORMAL
DIFFERENTIAL METHOD: ABNORMAL
EOSINOPHIL # BLD AUTO: 0 K/UL (ref 0–0.5)
EOSINOPHIL NFR BLD: 0 % (ref 0–8)
ERYTHROCYTE [DISTWIDTH] IN BLOOD BY AUTOMATED COUNT: 19.2 % (ref 11.5–14.5)
EST. GFR  (NO RACE VARIABLE): 18 ML/MIN/1.73 M^2
EST. GFR  (NO RACE VARIABLE): 20 ML/MIN/1.73 M^2
EST. GFR  (NO RACE VARIABLE): 20 ML/MIN/1.73 M^2
GLUCOSE SERPL-MCNC: 80 MG/DL (ref 70–110)
GLUCOSE SERPL-MCNC: 82 MG/DL (ref 70–110)
GLUCOSE SERPL-MCNC: 89 MG/DL (ref 70–110)
GLUCOSE UR QL STRIP: NEGATIVE
HCT VFR BLD AUTO: 36.1 % (ref 40–54)
HGB BLD-MCNC: 11.7 G/DL (ref 14–18)
HGB UR QL STRIP: ABNORMAL
HIV 1+2 AB+HIV1 P24 AG SERPL QL IA: NEGATIVE
HYPOCHROMIA BLD QL SMEAR: ABNORMAL
IMM GRANULOCYTES # BLD AUTO: 0.01 K/UL (ref 0–0.04)
IMM GRANULOCYTES NFR BLD AUTO: 0.2 % (ref 0–0.5)
KETONES UR QL STRIP: NEGATIVE
LEUKOCYTE ESTERASE UR QL STRIP: NEGATIVE
LIPASE SERPL-CCNC: 34 U/L (ref 4–60)
LYMPHOCYTES # BLD AUTO: 1.2 K/UL (ref 1–4.8)
LYMPHOCYTES NFR BLD: 26.6 % (ref 18–48)
MCH RBC QN AUTO: 26.5 PG (ref 27–31)
MCHC RBC AUTO-ENTMCNC: 32.4 G/DL (ref 32–36)
MCV RBC AUTO: 82 FL (ref 82–98)
MONOCYTES # BLD AUTO: 0.5 K/UL (ref 0.3–1)
MONOCYTES NFR BLD: 11.5 % (ref 4–15)
NEUTROPHILS # BLD AUTO: 2.7 K/UL (ref 1.8–7.7)
NEUTROPHILS NFR BLD: 60.8 % (ref 38–73)
NITRITE UR QL STRIP: NEGATIVE
NRBC BLD-RTO: 0 /100 WBC
OVALOCYTES BLD QL SMEAR: ABNORMAL
PH UR STRIP: 7 [PH] (ref 5–8)
PLATELET # BLD AUTO: 132 K/UL (ref 150–450)
PLATELET BLD QL SMEAR: ABNORMAL
PMV BLD AUTO: ABNORMAL FL (ref 9.2–12.9)
POIKILOCYTOSIS BLD QL SMEAR: SLIGHT
POTASSIUM SERPL-SCNC: 2.3 MMOL/L (ref 3.5–5.1)
POTASSIUM SERPL-SCNC: 2.6 MMOL/L (ref 3.5–5.1)
POTASSIUM SERPL-SCNC: 3.3 MMOL/L (ref 3.5–5.1)
PROT SERPL-MCNC: 7.9 G/DL (ref 6–8.4)
PROT UR QL STRIP: ABNORMAL
RBC # BLD AUTO: 4.41 M/UL (ref 4.6–6.2)
SARS-COV-2 RDRP RESP QL NAA+PROBE: NEGATIVE
SODIUM SERPL-SCNC: 141 MMOL/L (ref 136–145)
SODIUM SERPL-SCNC: 142 MMOL/L (ref 136–145)
SODIUM SERPL-SCNC: 144 MMOL/L (ref 136–145)
SP GR UR STRIP: 1.01 (ref 1–1.03)
TARGETS BLD QL SMEAR: ABNORMAL
URN SPEC COLLECT METH UR: ABNORMAL
UROBILINOGEN UR STRIP-ACNC: NEGATIVE EU/DL
WBC # BLD AUTO: 4.36 K/UL (ref 3.9–12.7)

## 2022-11-07 PROCEDURE — 93010 ELECTROCARDIOGRAM REPORT: CPT | Mod: ,,, | Performed by: INTERNAL MEDICINE

## 2022-11-07 PROCEDURE — 25000003 PHARM REV CODE 250: Performed by: NURSE PRACTITIONER

## 2022-11-07 PROCEDURE — 87635 SARS-COV-2 COVID-19 AMP PRB: CPT | Performed by: HOSPITALIST

## 2022-11-07 PROCEDURE — 51798 US URINE CAPACITY MEASURE: CPT

## 2022-11-07 PROCEDURE — 84443 ASSAY THYROID STIM HORMONE: CPT | Performed by: NURSE PRACTITIONER

## 2022-11-07 PROCEDURE — 85025 COMPLETE CBC W/AUTO DIFF WBC: CPT | Performed by: PHYSICIAN ASSISTANT

## 2022-11-07 PROCEDURE — 83690 ASSAY OF LIPASE: CPT | Performed by: EMERGENCY MEDICINE

## 2022-11-07 PROCEDURE — 81003 URINALYSIS AUTO W/O SCOPE: CPT | Performed by: PHYSICIAN ASSISTANT

## 2022-11-07 PROCEDURE — 36415 COLL VENOUS BLD VENIPUNCTURE: CPT | Performed by: NURSE PRACTITIONER

## 2022-11-07 PROCEDURE — 11000001 HC ACUTE MED/SURG PRIVATE ROOM

## 2022-11-07 PROCEDURE — 99285 EMERGENCY DEPT VISIT HI MDM: CPT | Mod: 25

## 2022-11-07 PROCEDURE — G0378 HOSPITAL OBSERVATION PER HR: HCPCS

## 2022-11-07 PROCEDURE — 25000003 PHARM REV CODE 250: Performed by: EMERGENCY MEDICINE

## 2022-11-07 PROCEDURE — 87389 HIV-1 AG W/HIV-1&-2 AB AG IA: CPT | Performed by: EMERGENCY MEDICINE

## 2022-11-07 PROCEDURE — 80048 BASIC METABOLIC PNL TOTAL CA: CPT | Mod: 91,XB | Performed by: EMERGENCY MEDICINE

## 2022-11-07 PROCEDURE — 99220 PR INITIAL OBSERVATION CARE,LEVL III: CPT | Mod: ,,, | Performed by: NURSE PRACTITIONER

## 2022-11-07 PROCEDURE — 80053 COMPREHEN METABOLIC PANEL: CPT | Performed by: PHYSICIAN ASSISTANT

## 2022-11-07 PROCEDURE — 63600175 PHARM REV CODE 636 W HCPCS: Performed by: NURSE PRACTITIONER

## 2022-11-07 PROCEDURE — 99220 PR INITIAL OBSERVATION CARE,LEVL III: ICD-10-PCS | Mod: ,,, | Performed by: NURSE PRACTITIONER

## 2022-11-07 PROCEDURE — 93010 EKG 12-LEAD: ICD-10-PCS | Mod: ,,, | Performed by: INTERNAL MEDICINE

## 2022-11-07 PROCEDURE — 93005 ELECTROCARDIOGRAM TRACING: CPT

## 2022-11-07 RX ORDER — POTASSIUM CHLORIDE 20 MEQ/1
40 TABLET, EXTENDED RELEASE ORAL ONCE
Status: DISCONTINUED | OUTPATIENT
Start: 2022-11-07 | End: 2022-11-07

## 2022-11-07 RX ORDER — POTASSIUM CHLORIDE 20 MEQ/1
40 TABLET, EXTENDED RELEASE ORAL EVERY 4 HOURS
Status: DISCONTINUED | OUTPATIENT
Start: 2022-11-07 | End: 2022-11-07

## 2022-11-07 RX ORDER — POTASSIUM CHLORIDE 7.45 MG/ML
10 INJECTION INTRAVENOUS
Status: DISCONTINUED | OUTPATIENT
Start: 2022-11-07 | End: 2022-11-07

## 2022-11-07 RX ORDER — ACETAMINOPHEN 325 MG/1
650 TABLET ORAL EVERY 4 HOURS PRN
Status: DISCONTINUED | OUTPATIENT
Start: 2022-11-07 | End: 2022-11-09 | Stop reason: HOSPADM

## 2022-11-07 RX ORDER — ONDANSETRON 2 MG/ML
4 INJECTION INTRAMUSCULAR; INTRAVENOUS EVERY 6 HOURS PRN
Status: DISCONTINUED | OUTPATIENT
Start: 2022-11-07 | End: 2022-11-09 | Stop reason: HOSPADM

## 2022-11-07 RX ORDER — POLYETHYLENE GLYCOL 3350 17 G/17G
17 POWDER, FOR SOLUTION ORAL DAILY PRN
Status: DISCONTINUED | OUTPATIENT
Start: 2022-11-07 | End: 2022-11-09 | Stop reason: HOSPADM

## 2022-11-07 RX ORDER — POTASSIUM CHLORIDE 750 MG/1
10 TABLET, EXTENDED RELEASE ORAL ONCE
Status: COMPLETED | OUTPATIENT
Start: 2022-11-07 | End: 2022-11-07

## 2022-11-07 RX ORDER — POTASSIUM CHLORIDE 7.45 MG/ML
10 INJECTION INTRAVENOUS
Status: COMPLETED | OUTPATIENT
Start: 2022-11-07 | End: 2022-11-08

## 2022-11-07 RX ORDER — METOLAZONE 5 MG/1
5 TABLET ORAL DAILY
COMMUNITY
Start: 2022-10-19

## 2022-11-07 RX ORDER — CARVEDILOL 12.5 MG/1
25 TABLET ORAL 2 TIMES DAILY WITH MEALS
Status: DISCONTINUED | OUTPATIENT
Start: 2022-11-07 | End: 2022-11-09 | Stop reason: HOSPADM

## 2022-11-07 RX ORDER — ALLOPURINOL 300 MG/1
300 TABLET ORAL DAILY
Status: DISCONTINUED | OUTPATIENT
Start: 2022-11-08 | End: 2022-11-08

## 2022-11-07 RX ORDER — NALOXONE HCL 0.4 MG/ML
0.02 VIAL (ML) INJECTION
Status: DISCONTINUED | OUTPATIENT
Start: 2022-11-07 | End: 2022-11-09 | Stop reason: HOSPADM

## 2022-11-07 RX ORDER — POTASSIUM CHLORIDE 20 MEQ/1
40 TABLET, EXTENDED RELEASE ORAL ONCE
Status: COMPLETED | OUTPATIENT
Start: 2022-11-07 | End: 2022-11-07

## 2022-11-07 RX ORDER — POTASSIUM CHLORIDE 20 MEQ/1
20 TABLET, EXTENDED RELEASE ORAL ONCE
Status: DISCONTINUED | OUTPATIENT
Start: 2022-11-07 | End: 2022-11-07

## 2022-11-07 RX ADMIN — POTASSIUM CHLORIDE 10 MEQ: 7.46 INJECTION, SOLUTION INTRAVENOUS at 11:11

## 2022-11-07 RX ADMIN — POTASSIUM CHLORIDE 10 MEQ: 750 TABLET, EXTENDED RELEASE ORAL at 05:11

## 2022-11-07 RX ADMIN — POTASSIUM CHLORIDE 40 MEQ: 1500 TABLET, EXTENDED RELEASE ORAL at 11:11

## 2022-11-07 RX ADMIN — CARVEDILOL 25 MG: 12.5 TABLET, FILM COATED ORAL at 11:11

## 2022-11-07 RX ADMIN — APIXABAN 2.5 MG: 2.5 TABLET, FILM COATED ORAL at 11:11

## 2022-11-07 NOTE — ED NOTES
Call x1 to collect blood specimens. No response   From: Aliya Sheldon  To: Gris Euceda  Sent: 9/13/2021 7:41 AM CDT  Subject: Question regarding PAP ORDER    Good morning Gris Dumont.     I’m confused about my Pap smear results. Could you tell me if it’s hpv or a yeast infection?     Thanks,  Aliya Sheldon

## 2022-11-07 NOTE — ED PROVIDER NOTES
Encounter Date: 11/7/2022    SCRIBE #1 NOTE: I, Lalitha Tinajero, am scribing for, and in the presence of,  Meredith Dior MD. I have scribed the entire note.     History     Chief Complaint   Patient presents with    Urinary Retention     Patient reports to ED with c/o Urinary Retention and lower abdominal discomfort since Thursday . Patient takes 40mg Lasix daily for fluid retention . Denies SOB. BP elevated in triage - patient states he did not take home meds this am.      Time seen by provider: 4:12 PM    This is a 57 y.o. male with a history of CHF, hypertension and stage 3 chronic kidney disease who presents with complaint of difficulty urinating and lower abdominal pain that began 4 days ago. He states that even when his bladder feels full, he has difficulty passing urine. He had a similar experience with this years ago, and was prescribed 40 mg lasix, which he now takes daily. His prescriber is Kim Guillen NP. He denies a history of enlarged prostate. He denies SOB or CP. The patienty has a PMHx of gout and HTN.  He states that he does see a nephrologist but he states that he was so does not need dialysis with his last visit.    The history is provided by the patient.   Review of patient's allergies indicates:  No Known Allergies  Past Medical History:   Diagnosis Date    CHF (congestive heart failure)     Gout     Hypertension     Renal disorder     Stage 3 chronic kidney disease      History reviewed. No pertinent surgical history.  History reviewed. No pertinent family history.  Social History     Tobacco Use    Smoking status: Never   Substance Use Topics    Alcohol use: No    Drug use: No     Review of Systems   Constitutional:  Negative for chills, diaphoresis and fever.   HENT:  Negative for sore throat.    Eyes:  Negative for photophobia and visual disturbance.   Respiratory:  Negative for cough and shortness of breath.    Cardiovascular:  Negative for chest pain and leg swelling.    Gastrointestinal:  Negative for abdominal pain, blood in stool, constipation, diarrhea, nausea and vomiting.   Genitourinary:  Positive for difficulty urinating. Negative for dysuria, frequency, hematuria and urgency.   Musculoskeletal:  Negative for neck pain and neck stiffness.   Skin:  Negative for rash and wound.   Neurological:  Negative for weakness, light-headedness, numbness and headaches.   Hematological:  Does not bruise/bleed easily.   Psychiatric/Behavioral:  Negative for confusion and suicidal ideas.    All other systems reviewed and are negative.    Physical Exam     Initial Vitals [11/07/22 1412]   BP Pulse Resp Temp SpO2   (!) 182/102 101 18 99.8 °F (37.7 °C) 98 %      MAP       --         Physical Exam    Nursing note and vitals reviewed.  Constitutional: He appears well-developed and well-nourished. No distress.   HENT:   Head: Normocephalic and atraumatic.   Eyes: Conjunctivae and EOM are normal. Pupils are equal, round, and reactive to light.   Neck: Neck supple.   Normal range of motion.  Cardiovascular:  Normal rate, regular rhythm, normal heart sounds and intact distal pulses.     Exam reveals no gallop and no friction rub.       No murmur heard.  Pulmonary/Chest: Breath sounds normal. No stridor. No respiratory distress. He has no wheezes. He has no rhonchi. He has no rales. He exhibits no tenderness.   Abdominal: Abdomen is soft. Bowel sounds are normal. He exhibits no mass.   Mild suprapubic tenderness. There is no rebound and no guarding.   Musculoskeletal:         General: Normal range of motion.      Cervical back: Normal range of motion and neck supple.      Comments: Mild bilateral lower extremity swelling.     Neurological: He is alert and oriented to person, place, and time.   No focal deficits on gross exam.   Skin: Skin is warm and dry.   Psychiatric: He has a normal mood and affect. His behavior is normal. Judgment and thought content normal.       ED Course   Procedures  Labs  Reviewed   URINALYSIS, REFLEX TO URINE CULTURE - Abnormal; Notable for the following components:       Result Value    Protein, UA Trace (*)     Occult Blood UA Trace (*)     All other components within normal limits    Narrative:     Specimen Source->Urine   CBC W/ AUTO DIFFERENTIAL - Abnormal; Notable for the following components:    RBC 4.41 (*)     Hemoglobin 11.7 (*)     Hematocrit 36.1 (*)     MCH 26.5 (*)     RDW 19.2 (*)     Platelets 132 (*)     Platelet Estimate Decreased (*)     All other components within normal limits   COMPREHENSIVE METABOLIC PANEL - Abnormal; Notable for the following components:    Potassium 2.6 (*)     Chloride 93 (*)     CO2 34 (*)     BUN 50 (*)     Creatinine 3.7 (*)     eGFR 18 (*)     All other components within normal limits    Narrative:     K  critical result(s) called and verbal readback obtained from   Richie Rendon RN by YUE 11/07/2022 17:15   BASIC METABOLIC PANEL - Abnormal; Notable for the following components:    Potassium 3.3 (*)     Chloride 94 (*)     CO2 34 (*)     BUN 48 (*)     Creatinine 3.5 (*)     eGFR 20 (*)     All other components within normal limits   BASIC METABOLIC PANEL - Abnormal; Notable for the following components:    Potassium 2.3 (*)     CO2 36 (*)     BUN 47 (*)     Creatinine 3.4 (*)     eGFR 20 (*)     All other components within normal limits    Narrative:     K   critical result(s) called and verbal readback obtained from   Jr Snell RN by SXJANY 11/07/2022 20:08   LIPASE   HIV 1 / 2 ANTIBODY    Narrative:     Release to patient->Immediate   HEPATITIS C ANTIBODY   ISTAT CHEM8     EKG Readings: (Independently Interpreted)   Initial Reading: No STEMI. Previous EKG Date: 6/7/2014. Rhythm: Atrial Fibrillation. Heart Rate: 60. ST Segments: Normal ST Segments. T Waves: Normal.     Imaging Results    None          Medications   potassium chloride SA CR tablet 10 mEq (10 mEq Oral Given 11/7/22 1730)     Medical Decision Making:   History:    Old Medical Records: I decided to obtain old medical records.  Initial Assessment:   This is a 57 y.o. male with a history of CHF, hypertension and stage 3 chronic kidney disease who presents with complaint of difficulty urinating and lower abdominal pain that began 4 days ago.   Differential Diagnosis:   Includes but not limited to urinary retention versus worsening CKD versus metabolic derangement versus dehydration versus UTI.  Clinical Tests:   Lab Tests: Ordered and Reviewed  Radiological Study: Ordered and Reviewed  Medical Tests: Ordered and Reviewed  ED Management:  Patient with a urination mild of 250 mL with a postvoid residual of 36.  Do not suspect urinary tension at this time.  Workup pending.  Will reassess.  Disposition pending results.    4:41 PM 11/7/2022  Josselin Dior MD      Update note:  Patient potassium 2.6 initially with labs.  Patient states that he has missed several days of his oral potassium.  Patient is a states 3 CKD status.  Gave home dose of potassium with repeat being 2.3.  No EKG changes.  Discussed case with Hospital Medicine, Felix Childress NP, who will admit the patient at this time with Dr. Alvarez as accepting MD. Patient updated on results and plan of care.  Patient verbalized understanding agrees with plan of care.    8:26 PM 11/7/2022  Josselin Dior MD    DISCLAIMER: This note was prepared with [employer] voice recognition transcription software. Garbled syntax, mangled pronouns, and other bizarre constructions may be attributed to that software system               Scribe Attestation:   Scribe #1: I performed the above scribed service and the documentation accurately describes the services I performed. I attest to the accuracy of the note.                 I, Josselin Dior MD, personally performed the services described in this documentation.  All medical record entries made by the scribe were at my direction and in my presence.  I have reviewed the chart and agree  that the record reflects my personal performance and is accurate and complete.   Clinical Impression:   Final diagnoses:  [E87.6] Hypokalemia      ED Disposition Condition    Observation                 Meredith Dior MD  11/07/22 2027

## 2022-11-07 NOTE — FIRST PROVIDER EVALUATION
Emergency Department TeleTriage Encounter Note      CHIEF COMPLAINT    Chief Complaint   Patient presents with    Urinary Retention     Patient reports to ED with c/o Urinary Retention and lower abdominal discomfort since Thursday . Patient takes 40mg Lasix daily for fluid retention . Denies SOB. BP elevated in triage - patient states he did not take home meds this am.        VITAL SIGNS   Initial Vitals [11/07/22 1412]   BP Pulse Resp Temp SpO2   (!) 182/102 101 18 99.8 °F (37.7 °C) 98 %      MAP       --            ALLERGIES    Review of patient's allergies indicates:  No Known Allergies    PROVIDER TRIAGE NOTE  This is a teletriage evaluation of a 57 y.o. male presenting to the ED complaining of urinary retention. Patient reports difficulty emptying bladder. He denies dysuria. He states he is taking lasix without improvement. Patient is refusing catheter.    Initial orders will be placed and care will be transferred to an alternate provider when patient is roomed for a full evaluation. Any additional orders and the final disposition will be determined by that provider.         ORDERS  Labs Reviewed   URINALYSIS, REFLEX TO URINE CULTURE   CBC W/ AUTO DIFFERENTIAL   COMPREHENSIVE METABOLIC PANEL       ED Orders (720h ago, onward)      Start Ordered     Status Ordering Provider    11/07/22 1458 11/07/22 1457  CBC auto differential  STAT         Ordered TRIXIE, CHAD G.    11/07/22 1458 11/07/22 1457  Comprehensive metabolic panel  STAT         Ordered TRIXIE, CHAD G.    11/07/22 1458 11/07/22 1457  Insert Saline lock IV  Once         Ordered TRIXIE, CHAD G.    11/07/22 1414 11/07/22 1413  Urinalysis, Reflex to Urine Culture Urine, Clean Catch  STAT         Ordered JENNIFER CHATMAN.    11/07/22 1414 11/07/22 1413  Urias to Belk  Until discontinued         Ordered JENNIFER CHATMAN.    11/07/22 1414 11/07/22 1413  Urias Catheter Care every 12 hours  Until discontinued         Ordered JENNIFER CHATMAN.     11/07/22 1414 11/07/22 1413  Nurse to discontinue ureña when patient no longer meets criteria  Until discontinued        Comments: Ureña Catheter Criteria:   1. Urinary Retention   2. Non-healing Sacral/Perineal Wound   3. Required Immobilization   4. Hospice/Comfort Care/Pallative Care   5. Critically ill in the ICU and requiring intensive monitoring   6. Chronic indwelling urinary catheter on admission   7. Placed by Urology Service    Ordered JENNIFER CHATMAN    11/07/22 1414 11/07/22 1413  Bladder scan  Once        Comments: PRN reason: per post ureña removal protocol or symptoms of urinary retention including urge to void, abdominal fullness, or distention.    Notify MD for bladder volume >300 mL        Ordered JENNIFER CHATMAN    Unscheduled 11/07/22 1413  Post Ureña Catheter Removal Protocol  Use PRN      Comments: Monitor Voiding output for 6 hours post catheter removal. Follow Bladder scan/intermittent catheter protocol.    Ordered JENNIFER CHATMAN              Virtual Visit Note: The provider triage portion of this emergency department evaluation and documentation was performed via [employer], a HIPAA-compliant telemedicine application, in concert with a tele-presenter in the room. A face to face patient evaluation with one of my colleagues will occur once the patient is placed in an emergency department room.      DISCLAIMER: This note was prepared with [employer] voice recognition transcription software. Garbled syntax, mangled pronouns, and other bizarre constructions may be attributed to that software system.

## 2022-11-07 NOTE — ED TRIAGE NOTES
"Present to the ER with c/o " I can't  urinate because my lasix don't work, it's backing up" " this just started thursday night, the medicine just not working" "i'm peeing but only when up" " when I go to sleep that's when  it  don't want to come out, I have to push"" something blocking me from urinate"  admits to back pain, denies fever/chills, " i'm not even short winded or anything" pt reports urinating pta and at the lobby, pt reports he has to push really hard to  urinate   "

## 2022-11-08 LAB
ALBUMIN SERPL BCP-MCNC: 3.4 G/DL (ref 3.5–5.2)
ALP SERPL-CCNC: 88 U/L (ref 55–135)
ALT SERPL W/O P-5'-P-CCNC: 14 U/L (ref 10–44)
ANION GAP SERPL CALC-SCNC: 10 MMOL/L (ref 8–16)
ANION GAP SERPL CALC-SCNC: 15 MMOL/L (ref 8–16)
ANISOCYTOSIS BLD QL SMEAR: SLIGHT
AST SERPL-CCNC: 13 U/L (ref 10–40)
BASOPHILS # BLD AUTO: 0.03 K/UL (ref 0–0.2)
BASOPHILS NFR BLD: 0.7 % (ref 0–1.9)
BILIRUB SERPL-MCNC: 1 MG/DL (ref 0.1–1)
BUN SERPL-MCNC: 46 MG/DL (ref 6–20)
BUN SERPL-MCNC: 49 MG/DL (ref 6–20)
CALCIUM SERPL-MCNC: 9.4 MG/DL (ref 8.7–10.5)
CALCIUM SERPL-MCNC: 9.7 MG/DL (ref 8.7–10.5)
CHLORIDE SERPL-SCNC: 94 MMOL/L (ref 95–110)
CHLORIDE SERPL-SCNC: 94 MMOL/L (ref 95–110)
CHLORIDE UR-SCNC: 47 MMOL/L (ref 25–200)
CO2 SERPL-SCNC: 33 MMOL/L (ref 23–29)
CO2 SERPL-SCNC: 37 MMOL/L (ref 23–29)
CREAT SERPL-MCNC: 3.3 MG/DL (ref 0.5–1.4)
CREAT SERPL-MCNC: 3.7 MG/DL (ref 0.5–1.4)
CREAT UR-MCNC: 110 MG/DL (ref 23–375)
CREAT UR-MCNC: 110 MG/DL (ref 23–375)
DACRYOCYTES BLD QL SMEAR: ABNORMAL
DIFFERENTIAL METHOD: ABNORMAL
EOSINOPHIL # BLD AUTO: 0 K/UL (ref 0–0.5)
EOSINOPHIL NFR BLD: 0 % (ref 0–8)
EOSINOPHIL URNS QL WRIGHT STN: NORMAL
ERYTHROCYTE [DISTWIDTH] IN BLOOD BY AUTOMATED COUNT: 19.4 % (ref 11.5–14.5)
EST. GFR  (NO RACE VARIABLE): 18 ML/MIN/1.73 M^2
EST. GFR  (NO RACE VARIABLE): 21 ML/MIN/1.73 M^2
GLUCOSE SERPL-MCNC: 145 MG/DL (ref 70–110)
GLUCOSE SERPL-MCNC: 89 MG/DL (ref 70–110)
HCT VFR BLD AUTO: 35.2 % (ref 40–54)
HGB BLD-MCNC: 11.2 G/DL (ref 14–18)
IMM GRANULOCYTES # BLD AUTO: 0 K/UL (ref 0–0.04)
IMM GRANULOCYTES NFR BLD AUTO: 0 % (ref 0–0.5)
LYMPHOCYTES # BLD AUTO: 0.8 K/UL (ref 1–4.8)
LYMPHOCYTES NFR BLD: 19.1 % (ref 18–48)
MAGNESIUM SERPL-MCNC: 1.7 MG/DL (ref 1.6–2.6)
MCH RBC QN AUTO: 26.2 PG (ref 27–31)
MCHC RBC AUTO-ENTMCNC: 31.8 G/DL (ref 32–36)
MCV RBC AUTO: 82 FL (ref 82–98)
MONOCYTES # BLD AUTO: 0.4 K/UL (ref 0.3–1)
MONOCYTES NFR BLD: 9.2 % (ref 4–15)
NEUTROPHILS # BLD AUTO: 3 K/UL (ref 1.8–7.7)
NEUTROPHILS NFR BLD: 71 % (ref 38–73)
NRBC BLD-RTO: 0 /100 WBC
OVALOCYTES BLD QL SMEAR: ABNORMAL
PHOSPHATE SERPL-MCNC: 3.2 MG/DL (ref 2.7–4.5)
PLATELET # BLD AUTO: 124 K/UL (ref 150–450)
PLATELET BLD QL SMEAR: ABNORMAL
PMV BLD AUTO: ABNORMAL FL (ref 9.2–12.9)
POIKILOCYTOSIS BLD QL SMEAR: SLIGHT
POTASSIUM SERPL-SCNC: 2.9 MMOL/L (ref 3.5–5.1)
POTASSIUM SERPL-SCNC: 3 MMOL/L (ref 3.5–5.1)
POTASSIUM UR-SCNC: 45 MMOL/L (ref 15–95)
PROT SERPL-MCNC: 7.3 G/DL (ref 6–8.4)
PROT UR-MCNC: 51 MG/DL (ref 0–15)
PROT/CREAT UR: 0.46 MG/G{CREAT} (ref 0–0.2)
RBC # BLD AUTO: 4.28 M/UL (ref 4.6–6.2)
SODIUM SERPL-SCNC: 141 MMOL/L (ref 136–145)
SODIUM SERPL-SCNC: 142 MMOL/L (ref 136–145)
SODIUM UR-SCNC: 58 MMOL/L (ref 20–250)
TARGETS BLD QL SMEAR: ABNORMAL
TSH SERPL DL<=0.005 MIU/L-ACNC: 1.77 UIU/ML (ref 0.4–4)
UUN UR-MCNC: 469 MG/DL (ref 140–1050)
WBC # BLD AUTO: 4.23 K/UL (ref 3.9–12.7)

## 2022-11-08 PROCEDURE — 25000003 PHARM REV CODE 250: Performed by: PHYSICIAN ASSISTANT

## 2022-11-08 PROCEDURE — 80048 BASIC METABOLIC PNL TOTAL CA: CPT | Mod: XB | Performed by: NURSE PRACTITIONER

## 2022-11-08 PROCEDURE — 84133 ASSAY OF URINE POTASSIUM: CPT | Performed by: PHYSICIAN ASSISTANT

## 2022-11-08 PROCEDURE — 82436 ASSAY OF URINE CHLORIDE: CPT | Performed by: PHYSICIAN ASSISTANT

## 2022-11-08 PROCEDURE — 21400001 HC TELEMETRY ROOM

## 2022-11-08 PROCEDURE — 99226 PR SUBSEQUENT OBSERVATION CARE,LEVEL III: ICD-10-PCS | Mod: ,,, | Performed by: PHYSICIAN ASSISTANT

## 2022-11-08 PROCEDURE — 25000003 PHARM REV CODE 250: Performed by: NURSE PRACTITIONER

## 2022-11-08 PROCEDURE — 84100 ASSAY OF PHOSPHORUS: CPT | Performed by: NURSE PRACTITIONER

## 2022-11-08 PROCEDURE — 80053 COMPREHEN METABOLIC PANEL: CPT | Performed by: NURSE PRACTITIONER

## 2022-11-08 PROCEDURE — 84540 ASSAY OF URINE/UREA-N: CPT | Performed by: PHYSICIAN ASSISTANT

## 2022-11-08 PROCEDURE — 87205 SMEAR GRAM STAIN: CPT | Performed by: PHYSICIAN ASSISTANT

## 2022-11-08 PROCEDURE — 63600175 PHARM REV CODE 636 W HCPCS: Performed by: NURSE PRACTITIONER

## 2022-11-08 PROCEDURE — 84300 ASSAY OF URINE SODIUM: CPT | Performed by: PHYSICIAN ASSISTANT

## 2022-11-08 PROCEDURE — 83735 ASSAY OF MAGNESIUM: CPT | Performed by: NURSE PRACTITIONER

## 2022-11-08 PROCEDURE — 82570 ASSAY OF URINE CREATININE: CPT | Performed by: PHYSICIAN ASSISTANT

## 2022-11-08 PROCEDURE — 99226 PR SUBSEQUENT OBSERVATION CARE,LEVEL III: CPT | Mod: ,,, | Performed by: PHYSICIAN ASSISTANT

## 2022-11-08 PROCEDURE — 36415 COLL VENOUS BLD VENIPUNCTURE: CPT | Performed by: NURSE PRACTITIONER

## 2022-11-08 PROCEDURE — 85025 COMPLETE CBC W/AUTO DIFF WBC: CPT | Performed by: NURSE PRACTITIONER

## 2022-11-08 RX ORDER — ALLOPURINOL 100 MG/1
200 TABLET ORAL DAILY
Status: DISCONTINUED | OUTPATIENT
Start: 2022-11-09 | End: 2022-11-09 | Stop reason: HOSPADM

## 2022-11-08 RX ORDER — POTASSIUM CHLORIDE 20 MEQ/1
40 TABLET, EXTENDED RELEASE ORAL 3 TIMES DAILY
Status: DISCONTINUED | OUTPATIENT
Start: 2022-11-08 | End: 2022-11-09

## 2022-11-08 RX ORDER — LANOLIN ALCOHOL/MO/W.PET/CERES
400 CREAM (GRAM) TOPICAL ONCE
Status: COMPLETED | OUTPATIENT
Start: 2022-11-08 | End: 2022-11-08

## 2022-11-08 RX ORDER — AMLODIPINE BESYLATE 5 MG/1
5 TABLET ORAL DAILY
Status: DISCONTINUED | OUTPATIENT
Start: 2022-11-08 | End: 2022-11-09 | Stop reason: HOSPADM

## 2022-11-08 RX ORDER — FUROSEMIDE 40 MG/1
40 TABLET ORAL DAILY
Status: DISCONTINUED | OUTPATIENT
Start: 2022-11-09 | End: 2022-11-09 | Stop reason: HOSPADM

## 2022-11-08 RX ORDER — HYDRALAZINE HYDROCHLORIDE 20 MG/ML
10 INJECTION INTRAMUSCULAR; INTRAVENOUS EVERY 8 HOURS PRN
Status: DISCONTINUED | OUTPATIENT
Start: 2022-11-08 | End: 2022-11-09 | Stop reason: HOSPADM

## 2022-11-08 RX ADMIN — ALLOPURINOL 300 MG: 300 TABLET ORAL at 08:11

## 2022-11-08 RX ADMIN — CARVEDILOL 25 MG: 12.5 TABLET, FILM COATED ORAL at 08:11

## 2022-11-08 RX ADMIN — APIXABAN 2.5 MG: 2.5 TABLET, FILM COATED ORAL at 08:11

## 2022-11-08 RX ADMIN — POTASSIUM CHLORIDE 40 MEQ: 1500 TABLET, EXTENDED RELEASE ORAL at 08:11

## 2022-11-08 RX ADMIN — POTASSIUM CHLORIDE 40 MEQ: 1500 TABLET, EXTENDED RELEASE ORAL at 02:11

## 2022-11-08 RX ADMIN — POTASSIUM CHLORIDE 10 MEQ: 7.46 INJECTION, SOLUTION INTRAVENOUS at 12:11

## 2022-11-08 RX ADMIN — Medication 400 MG: at 09:11

## 2022-11-08 RX ADMIN — CARVEDILOL 25 MG: 12.5 TABLET, FILM COATED ORAL at 04:11

## 2022-11-08 RX ADMIN — AMLODIPINE BESYLATE 5 MG: 5 TABLET ORAL at 10:11

## 2022-11-08 RX ADMIN — HYDRALAZINE HYDROCHLORIDE 10 MG: 20 INJECTION, SOLUTION INTRAMUSCULAR; INTRAVENOUS at 12:11

## 2022-11-08 NOTE — ASSESSMENT & PLAN NOTE
Well compensated.  Chronic peripheral edema.  Patient is prescribed Lasix 40 mg b.i.d., metolazone 5 mg daily, and carvedilol 25 mg b.i.d.

## 2022-11-08 NOTE — HPI
Mr. Mcdaniel is a 57-year-old male with a past medical history of atrial fibrillation, hypokalemia, chronic kidney disease, heart failure, and gout.  Patient reports he has been having decreased urine output since Thursday with associated lower abdominal discomfort.  He denies decreased oral intake.  In the ED, around 4:00 p.m. there was 63 mL on bladder scan.  Patient denies chest pain shortness of breath.  He states he did not take any medications today.  Patient's calcium level was 2.6 in the ED. he had an elevated creatinine of 3.7.  While in the ED, patient reports he started urinating.  Approximately 100 cc seen in urinal.  Patient admitted to hospital medicine for further workup.

## 2022-11-08 NOTE — ASSESSMENT & PLAN NOTE
Hypokalemia  Chronic kidney disease stage 2  Urinary retention  Patient with a creatinine of 3.7, up from 3.2.  Baseline varies.   Potassium 2.6; baseline not much higher    -Previously seen by Dr Ríos- last visit in May  Per Dr Ríos Note:  Patient tried Bumex but did not feel like it was effective and Lasix 40 mg b.i.d. was restarted, patient rarely took metolazone, at 1 point creatinine had risen up to 5.89 with GFR 12.  Lasix was adjusted to 40 mg b.i.d. on Monday Wednesday and Friday with only 40 mg once daily the rest of the week; KCL 20 mEqs b.i.d. while diuresing    -supplemental potassium ordered p.r.n.  -kidney ultrasound ordered: no acute process  -nephrology consulted:  - Hold Lisinopril while diuresing.  BB/CCB for now  - He didn't feel he diuresed with Bumex so put himself back on Lasix 40mg bid. Takes Metolazone rarely.  - Have gone over diet excessively and ways to avoid hidden salt.  -continue lasix but daily  -patient began to produce urine while in the ED

## 2022-11-08 NOTE — SUBJECTIVE & OBJECTIVE
Past Medical History:   Diagnosis Date    Anticoagulant long-term use     CHF (congestive heart failure)     Gout     Hypertension     Renal disorder     Stage 3 chronic kidney disease        History reviewed. No pertinent surgical history.    Review of patient's allergies indicates:  No Known Allergies    No current facility-administered medications on file prior to encounter.     Current Outpatient Medications on File Prior to Encounter   Medication Sig    allopurinol (ZYLOPRIM) 300 MG tablet Take 300 mg by mouth once daily.    apixaban (ELIQUIS) 2.5 mg Tab Take 2.5 mg by mouth 2 (two) times daily.    carvedilol (COREG) 25 MG tablet Take 1 tablet (25 mg total) by mouth 2 (two) times daily with meals.    fish oil-omega-3 fatty acids 300-1,000 mg capsule Take 2 g by mouth once daily.    furosemide (LASIX) 40 MG tablet Take 1 tablet (40 mg total) by mouth once daily.    metOLazone (ZAROXOLYN) 5 MG tablet Take 5 mg by mouth once daily.    potassium chloride (KLOR-CON) 10 MEQ TbSR Take 1 tablet (10 mEq total) by mouth once daily. (Patient taking differently: Take 20 mEq by mouth once daily.)     Family History       Problem Relation (Age of Onset)    Cancer Father    Heart disease Mother          Tobacco Use    Smoking status: Never    Smokeless tobacco: Not on file   Substance and Sexual Activity    Alcohol use: No    Drug use: No    Sexual activity: Not Currently     Review of Systems   Constitutional: Negative.  Negative for activity change, appetite change, chills and fever.   HENT: Negative.  Negative for congestion, mouth sores and trouble swallowing.    Eyes: Negative.  Negative for photophobia, pain and visual disturbance.   Respiratory: Negative.  Negative for cough, choking, chest tightness and shortness of breath.    Cardiovascular:  Positive for leg swelling (chronic). Negative for chest pain and palpitations.   Gastrointestinal:  Positive for abdominal pain (lower abdominal pain/pressure). Negative for  abdominal distention, blood in stool, constipation, diarrhea, nausea and vomiting.        Recent BM   Endocrine: Negative.  Negative for polydipsia, polyphagia and polyuria.   Genitourinary:  Positive for decreased urine volume. Negative for difficulty urinating, dysuria and enuresis.   Musculoskeletal: Negative.  Negative for arthralgias, back pain and gait problem.   Skin: Negative.  Negative for pallor and rash.   Allergic/Immunologic: Negative.  Negative for environmental allergies and food allergies.   Neurological: Negative.  Negative for dizziness, facial asymmetry, speech difficulty, weakness and headaches.   Hematological: Negative.    Psychiatric/Behavioral: Negative.  Negative for agitation, behavioral problems and confusion.    Objective:     Vital Signs (Most Recent):  Temp: 98.3 °F (36.8 °C) (11/08/22 0022)  Pulse: 61 (11/08/22 0028)  Resp: 18 (11/07/22 2237)  BP: (!) 171/86 (11/08/22 0050)  SpO2: (!) 93 % (11/08/22 0022)   Vital Signs (24h Range):  Temp:  [98.2 °F (36.8 °C)-99.8 °F (37.7 °C)] 98.3 °F (36.8 °C)  Pulse:  [] 61  Resp:  [15-18] 18  SpO2:  [93 %-98 %] 93 %  BP: (167-185)/() 171/86     Weight: 122.5 kg (270 lb)  Body mass index is 42.29 kg/m².    Physical Exam  Vitals reviewed.   Constitutional:       General: He is not in acute distress.     Appearance: Normal appearance.   HENT:      Head: Normocephalic and atraumatic.      Mouth/Throat:      Mouth: Mucous membranes are moist.      Pharynx: Oropharynx is clear. No posterior oropharyngeal erythema.   Eyes:      Extraocular Movements: Extraocular movements intact.      Pupils: Pupils are equal, round, and reactive to light.   Cardiovascular:      Rate and Rhythm: Normal rate and regular rhythm.      Pulses: Normal pulses.      Heart sounds: Normal heart sounds. No murmur heard.  Pulmonary:      Effort: Pulmonary effort is normal. No respiratory distress.      Breath sounds: Normal breath sounds. No wheezing.   Abdominal:       General: Bowel sounds are normal. There is no distension.      Palpations: Abdomen is soft.      Tenderness: There is abdominal tenderness (lower abdomen). There is no guarding.   Musculoskeletal:         General: No swelling, tenderness or signs of injury. Normal range of motion.      Cervical back: Normal range of motion and neck supple. No rigidity or tenderness.      Right lower leg: Edema (2+) present.      Left lower leg: Edema (2+) present.   Skin:     General: Skin is dry.      Capillary Refill: Capillary refill takes less than 2 seconds.      Comments: Scaly lower extremities   Neurological:      General: No focal deficit present.      Mental Status: He is alert and oriented to person, place, and time.      Cranial Nerves: No cranial nerve deficit.      Sensory: No sensory deficit.      Motor: No weakness.   Psychiatric:         Mood and Affect: Mood normal.         Behavior: Behavior normal.         CRANIAL NERVES     CN III, IV, VI   Pupils are equal, round, and reactive to light.     Significant Labs: All pertinent labs within the past 24 hours have been reviewed.  BMP:   Recent Labs   Lab 11/07/22 1941   GLU 80      K 2.3*   CL 96   CO2 36*   BUN 47*   CREATININE 3.4*   CALCIUM 9.2     CBC:   Recent Labs   Lab 11/07/22 1626   WBC 4.36   HGB 11.7*   HCT 36.1*   *     CMP:   Recent Labs   Lab 11/07/22  1626 11/07/22  1859 11/07/22 1941    141 144   K 2.6* 3.3* 2.3*   CL 93* 94* 96   CO2 34* 34* 36*   GLU 89 82 80   BUN 50* 48* 47*   CREATININE 3.7* 3.5* 3.4*   CALCIUM 9.8 9.4 9.2   PROT 7.9  --   --    ALBUMIN 3.6  --   --    BILITOT 0.9  --   --    ALKPHOS 87  --   --    AST 16  --   --    ALT 16  --   --    ANIONGAP 15 13 12     Troponin: No results for input(s): TROPONINI, TROPONINIHS in the last 48 hours.  TSH:   Recent Labs   Lab 11/07/22  2254   TSH 1.773     Urine Culture: No results for input(s): LABURIN in the last 48 hours.  Urine Studies:   Recent Labs   Lab 11/07/22  1621    COLORU Yellow   APPEARANCEUA Clear   PHUR 7.0   SPECGRAV 1.010   PROTEINUA Trace*   GLUCUA Negative   KETONESU Negative   BILIRUBINUA Negative   OCCULTUA Trace*   NITRITE Negative   UROBILINOGEN Negative   LEUKOCYTESUR Negative       Significant Imaging: I have reviewed all pertinent imaging results/findings within the past 24 hours.  X-Ray Knee 1 or 2 View Left  Narrative: EXAMINATION:  XR KNEE 1 OR 2 VIEW LEFT    CLINICAL HISTORY:  Pain in unspecified knee    COMPARISON:  None.    FINDINGS:  The alignment is within normal limits.  No displaced fractures identified.  No evidence of lytic or blastic lesions.Joint spaces are unremarkable.Prepatellar soft tissue swelling potentially hematoma or prepatellar bursitis.  Impression: No evidence of fracture.Prepatellar soft tissue swelling may be that of hematoma or prepatellar bursitis.    Electronically signed by: Carmine Moura MD  Date:    06/04/2022  Time:    16:29

## 2022-11-08 NOTE — CONSULTS
Consult Note  Nephrology    Consult Requested By: JAN Braswell MD  Reason for Consult: KASHIF    SUBJECTIVE:     History of Present Illness:  Patient is a 57 y.o. male presents with difficulty voiding despite lasix and found to have hypokalemia/KASHIF.  Felt that he has been needing to push urine out and getting stuck.  Pt known to us from clinic and formerly followed Dr. Sharma and now Dr. Kelsey with advanced CKD IV (creat 3-3.2 but last was 5.9 in May) from HTN/CHF/Diuretics.  Admitted for K replacement (K 2.3) and KASHIF eval.  Consulted and pt seen/examined this am.  Feels much better and able to void.  Creat near baseline and K 2.9.  pt states that his  recent Rx was for K was not correct.  Denies NSAIDs.  Awaiting US/bladder scan..      Epic reviewed.      Past Medical History:   Diagnosis Date    Anticoagulant long-term use     CHF (congestive heart failure)     Gout     Hypertension     Malignant hypertensive heart and chronic kidney disease stage IV     Renal disorder      History reviewed. No pertinent surgical history.  Family History   Problem Relation Age of Onset    Heart disease Mother     Cancer Father      Social History     Tobacco Use    Smoking status: Never   Substance Use Topics    Alcohol use: No    Drug use: No       Review of patient's allergies indicates:  No Known Allergies     Review of Systems:  Constitutional: No fever or chills  Respiratory: No cough or shortness of breath  Cardiovascular: No chest pain or palpitations  Gastrointestinal: No nausea or vomiting  Neurological: No confusion or weakness    OBJECTIVE:     Vital Signs (Most Recent)  Temp: 98.3 °F (36.8 °C) (11/08/22 0532)  Pulse: 83 (11/08/22 0837)  Resp: 18 (11/08/22 0532)  BP: (!) 153/96 (11/08/22 0532)  SpO2: 95 % (11/08/22 0532)    Vital Signs Range (Last 24H):  Temp:  [98.2 °F (36.8 °C)-99.8 °F (37.7 °C)]   Pulse:  []   Resp:  [15-18]   BP: (153-185)/()   SpO2:  [93 %-98 %]       Intake/Output Summary (Last  24 hours) at 11/8/2022 1011  Last data filed at 11/8/2022 0602  Gross per 24 hour   Intake --   Output 1000 ml   Net -1000 ml       Physical Exam:  General appearance: Well developed, well nourished  Eyes:  Conjunctivae/corneas clear. PERRL.  Lungs: Normal respiratory effort,   clear to auscultation bilaterally   Heart: Regular rate and rhythm, S1, S2 normal, no murmur, rub or darrius.  Abdomen: Soft, non-tender non-distended; bowel sounds normal; no masses,  no organomegaly, obese  Extremities: No cyanosis or clubbing. 1+ edema.    Skin: Skin color, texture, turgor normal. No rashes or lesions  Neurologic: Normal strength and tone. No focal numbness or weakness       Laboratory:  Recent Labs   Lab 11/08/22  0508   WBC 4.23   RBC 4.28*   HGB 11.2*   HCT 35.2*   *   MCV 82   MCH 26.2*   MCHC 31.8*     BMP:   Recent Labs   Lab 11/08/22  0508   GLU 89      K 2.9*   CL 94*   CO2 33*   BUN 46*   CREATININE 3.3*   CALCIUM 9.7   MG 1.7     Lab Results   Component Value Date    CALCIUM 9.7 11/08/2022    PHOS 3.2 11/08/2022     BNP  No results for input(s): BNP, BNPTRIAGEBLO in the last 168 hours.  Lab Results   Component Value Date    URICACID 12.8 (H) 03/26/2012     Lab Results   Component Value Date    IRON 17 (L) 03/26/2012    TIBC 370.0 03/26/2012    FERRITIN 45 03/26/2012     Lab Results   Component Value Date     (H) 03/26/2012    CALCIUM 9.7 11/08/2022    PHOS 3.2 11/08/2022       Diagnostic Results:  US Retroperitoneal Complete    (Results Pending)       ASSESSMENT/PLAN:     Hypertensive heart with chronic systolic heart failure and acute kidney injury on CKD Stage 4/A3  - echo with EF 45%, severe pulm HTN (PAP 72 mmHg).  - Followed by Dr. Sethi.  - Has been adherent to low sodium diet, avoiding fast foods and canned foods.  - Stressed again to avoid all salt and discussed how processed and pre-packaged foods often have more salt intake that is realized.  - Hold Lisinopril while diuresing.   BB/CCB for now  - He didn't feel he diuresed with Bumex so put himself back on Lasix 40mg bid. Takes Metolazone rarely.  - Have gone over diet excessively and ways to avoid hidden salt.  - Has compression stockings.  - weighs daily at home.  - Recent Cr 3.1-3.2 but Cr cami to 5.89 (eGFR 12) in May. Adjusted Lasix to 40mg bid on MWF and rest of the week only take 40mg once daily.  -creat now at baseline 3.4ish.   -continue lasix but daily.  -will need AVF at outpt to prep for RRT in future based on trends.  -f/up with Dr. Kelsey.  -awaiting US  -Renally dose meds, avoid nephrotoxins, and monitor I/O's closely.       Hypokalemia  - Due to diuretics.  - aggressive PO/IV replacement  - mag wnl  -will check again this pm     Metabolic alkalosis   - Likely metabolic compensation from underlying QI/respiratory acidosis.  - Completed Diamox 250mg bid x2 weeks during prior hospitalization     Vitamin D deficiency  - Vitamin D and iPTH acceptable with D3 2000 units daily.     Hyperuricemia  - Continue allopurinol     Morbid obesity  - Discussed adherence to low salt diet.  - Applauded recent exercising--challenged him to increase walking to 30 min/day.       Thanks for consult  See above  Will follow along.   Home once K repleted.

## 2022-11-08 NOTE — SUBJECTIVE & OBJECTIVE
Interval History: seen at bedside, about 200cc urine in urinal. Reprots improvement in urination since admission, appreciate nephro recs    Review of Systems   Constitutional:  Negative for chills and fever.   HENT:  Negative for congestion.    Respiratory:  Negative for cough and shortness of breath.    Cardiovascular:  Positive for leg swelling (chronic). Negative for chest pain.   Gastrointestinal:  Positive for abdominal pain (lower abdominal pain/pressure). Negative for abdominal distention, diarrhea, nausea and vomiting.        Recent BM   Genitourinary:  Positive for decreased urine volume and difficulty urinating. Negative for dysuria and enuresis.   Musculoskeletal:  Negative for arthralgias, back pain and gait problem.   Skin:  Negative for pallor and rash.   Allergic/Immunologic: Negative for environmental allergies and food allergies.   Neurological:  Negative for dizziness, facial asymmetry, speech difficulty, weakness and headaches.   Psychiatric/Behavioral:  Negative for agitation, behavioral problems and confusion.    Objective:     Vital Signs (Most Recent):  Temp: 97.7 °F (36.5 °C) (11/08/22 1608)  Pulse: 62 (11/08/22 1624)  Resp: 18 (11/08/22 1608)  BP: 135/64 (11/08/22 1608)  SpO2: 98 % (11/08/22 1608) Vital Signs (24h Range):  Temp:  [97.7 °F (36.5 °C)-98.3 °F (36.8 °C)] 97.7 °F (36.5 °C)  Pulse:  [54-83] 62  Resp:  [15-20] 18  SpO2:  [93 %-98 %] 98 %  BP: (135-185)/() 135/64     Weight: 122.5 kg (270 lb)  Body mass index is 42.29 kg/m².    Intake/Output Summary (Last 24 hours) at 11/8/2022 1627  Last data filed at 11/8/2022 1324  Gross per 24 hour   Intake 600 ml   Output 1125 ml   Net -525 ml      Physical Exam  Vitals reviewed.   Constitutional:       General: He is not in acute distress.     Appearance: He is not ill-appearing.   HENT:      Head: Normocephalic and atraumatic.   Eyes:      Extraocular Movements: Extraocular movements intact.   Cardiovascular:      Rate and Rhythm:  Normal rate and regular rhythm.   Pulmonary:      Effort: Pulmonary effort is normal. No respiratory distress.      Breath sounds: Normal breath sounds. No wheezing.   Abdominal:      General: Bowel sounds are normal.      Palpations: Abdomen is soft.      Tenderness: There is no abdominal tenderness.   Musculoskeletal:         General: Normal range of motion.      Cervical back: Normal range of motion and neck supple.      Right lower leg: Edema (2+) present.      Left lower leg: Edema (2+) present.   Skin:     Comments: Scaly lower extremities   Neurological:      General: No focal deficit present.      Mental Status: He is alert and oriented to person, place, and time.   Psychiatric:         Mood and Affect: Mood normal.         Behavior: Behavior normal.       Significant Labs: All pertinent labs within the past 24 hours have been reviewed.    Significant Imaging: I have reviewed all pertinent imaging results/findings within the past 24 hours.

## 2022-11-08 NOTE — PLAN OF CARE
Initial Discharge Planning Assessment:  Patient admitted on: 11/7/22     Chart reviewed, Care plan discussed with treatment team,  attending Roberto     PCP updated in Epic: Dr. Webster  Pharmacy, updated in Epic: [facility]      DME at home: None       Current dispo: Home       Transportation: Family can provide      Power of  or Living Will:      Anticipated DC needs from CM perspective:                       11/08/22 0949   Discharge Assessment   Assessment Type Discharge Planning Assessment   Confirmed/corrected address, phone number and insurance Yes   Confirmed Demographics Correct on Facesheet   Source of Information patient;health record   Lives With alone   Do you expect to return to your current living situation? Yes   Do you have help at home or someone to help you manage your care at home? No   Prior to hospitilization cognitive status: Alert/Oriented   Current cognitive status: Alert/Oriented   Walking or Climbing Stairs Difficulty none   Dressing/Bathing Difficulty none   Equipment Currently Used at Home none   Readmission within 30 days? No   Patient currently being followed by outpatient case management? No   Do you currently have service(s) that help you manage your care at home? No   Do you take prescription medications? Yes   Do you have prescription coverage? Yes   Do you have any problems affording any of your prescribed medications? No   Is the patient taking medications as prescribed? yes   How do you get to doctors appointments? health plan transportation   Are you on dialysis? No   Do you take coumadin? No   Discharge Plan A Home   Discharge Plan B Home with family   DME Needed Upon Discharge  none   Discharge Plan discussed with: Patient   Discharge Barriers Identified None   Physical Activity   On average, how many days per week do you engage in moderate to strenuous exercise (like a brisk walk)? Patient refu   On average, how many minutes do you engage in exercise at this level?  Left detailed message to have pt call back to schedule appt Patient refu   Financial Resource Strain   How hard is it for you to pay for the very basics like food, housing, medical care, and heating? Patient refu   Housing Stability   In the last 12 months, was there a time when you were not able to pay the mortgage or rent on time? [place]   In the last 12 months, was there a time when you did not have a steady place to sleep or slept in a shelter (including now)? [place]   Transportation Needs   In the past 12 months, has lack of transportation kept you from medical appointments or from getting medications? Patient refu   In the past 12 months, has lack of transportation kept you from meetings, work, or from getting things needed for daily living? Patient refu   Food Insecurity   Within the past 12 months, you worried that your food would run out before you got the money to buy more. Patient refu   Within the past 12 months, the food you bought just didn't last and you didn't have money to get more. Patient refu   Stress   Do you feel stress - tense, restless, nervous, or anxious, or unable to sleep at night because your mind is troubled all the time - these days? Patient refu   Social Connections   In a typical week, how many times do you talk on the phone with family, friends, or neighbors? Patient refu   How often do you get together with friends or relatives? Patient refu   How often do you attend [employer] or [employer] services? Patient refu   Do you belong to any clubs or organizations such as [employer] groups, unions, fraternal or athletic groups, or school groups? Patient refu   How often do you attend meetings of the clubs or organizations you belong to? Patient refu   Are you , , , , never , or living with a partner? Patient refu   Alcohol Use   Q1: How often do you have a drink containing alcohol? Patient refu   Q2: How many drinks containing alcohol do you have on a typical day when you are drinking? Patient refu

## 2022-11-08 NOTE — ASSESSMENT & PLAN NOTE
Uncontrolled.  Patient did not take any medications today and possibly not since last Thursday.  Will adjust blood pressure medications as needed.

## 2022-11-08 NOTE — PLAN OF CARE
Problem: Renal Function Impairment (Acute Kidney Injury/Impairment)  Goal: Effective Renal Function  Intervention: Monitor and Support Renal Function  Flowsheets (Taken 11/8/2022 1520)  Stabilization Measures: (s/p US kidney 11/8) --     Problem: Electrolyte Imbalance  Goal: Electrolyte Balance  Intervention: Monitor and Manage Electrolyte Imbalance  Flowsheets (Taken 11/8/2022 1520)  Fluid/Electrolyte Management: (K 40meQ per order) electrolyte-binding therapy initiated

## 2022-11-08 NOTE — HOSPITAL COURSE
Jonas Beltre was admitted for urinary retention, KASHIF. Started on lasix with good UOP. Nephrology consulted, appreciate recs: making good urine with lasix. K + improved. Stable for dc with lasix 40mg daily and K+ 40mg BID. Follow up with nephrology outpatient. Stable for dc with return precautions discussed, no further questios at dc

## 2022-11-08 NOTE — H&P
[facility] S[facility] Medicine  History & Physical    Patient Name: Jonas Beltre  MRN: [id number]  Patient Class: OP- Observation  Admission Date: 11/7/2022  Attending Physician: patient and ER records  Primary Care Provider: Raymond Webster MD         Patient information was obtained from patient and ER records.     Subjective:     Principal Problem:KASHIF (acute kidney injury)    Chief Complaint:   Chief Complaint   Patient presents with    Urinary Retention     Patient reports to ED with c/o Urinary Retention and lower abdominal discomfort since Thursday . Patient takes 40mg Lasix daily for fluid retention . Denies SOB. BP elevated in triage - patient states he did not take home meds this am.         HPI: Mr. Mcdaniel is a 57-year-old male with a past medical history of atrial fibrillation, hypokalemia, chronic kidney disease, heart failure, and gout.  Patient reports he has been having decreased urine output since Thursday with associated lower abdominal discomfort.  He denies decreased oral intake.  In the ED, around 4:00 p.m. there was 63 mL on bladder scan.  Patient denies chest pain shortness of breath.  He states he did not take any medications today.  Patient's calcium level was 2.6 in the ED. he had an elevated creatinine of 3.7.  While in the ED, patient reports he started urinating.  Approximately 100 cc seen in urinal.  Patient admitted to hospital medicine for further workup.    Past Medical History:   Diagnosis Date    Anticoagulant long-term use     CHF (congestive heart failure)     Gout     Hypertension     Renal disorder     Stage 3 chronic kidney disease        History reviewed. No pertinent surgical history.    Review of patient's allergies indicates:  No Known Allergies    No current facility-administered medications on file prior to encounter.     Current Outpatient Medications on File Prior to Encounter   Medication Sig    allopurinol (ZYLOPRIM) 300 MG tablet Take 300 mg by mouth  once daily.    apixaban (ELIQUIS) 2.5 mg Tab Take 2.5 mg by mouth 2 (two) times daily.    carvedilol (COREG) 25 MG tablet Take 1 tablet (25 mg total) by mouth 2 (two) times daily with meals.    fish oil-omega-3 fatty acids 300-1,000 mg capsule Take 2 g by mouth once daily.    furosemide (LASIX) 40 MG tablet Take 1 tablet (40 mg total) by mouth once daily.    metOLazone (ZAROXOLYN) 5 MG tablet Take 5 mg by mouth once daily.    potassium chloride (KLOR-CON) 10 MEQ TbSR Take 1 tablet (10 mEq total) by mouth once daily. (Patient taking differently: Take 20 mEq by mouth once daily.)     Family History       Problem Relation (Age of Onset)    Cancer Father    Heart disease Mother          Tobacco Use    Smoking status: Never    Smokeless tobacco: Not on file   Substance and Sexual Activity    Alcohol use: No    Drug use: No    Sexual activity: Not Currently     Review of Systems   Constitutional: Negative.  Negative for activity change, appetite change, chills and fever.   HENT: Negative.  Negative for congestion, mouth sores and trouble swallowing.    Eyes: Negative.  Negative for photophobia, pain and visual disturbance.   Respiratory: Negative.  Negative for cough, choking, chest tightness and shortness of breath.    Cardiovascular:  Positive for leg swelling (chronic). Negative for chest pain and palpitations.   Gastrointestinal:  Positive for abdominal pain (lower abdominal pain/pressure). Negative for abdominal distention, blood in stool, constipation, diarrhea, nausea and vomiting.        Recent BM   Endocrine: Negative.  Negative for polydipsia, polyphagia and polyuria.   Genitourinary:  Positive for decreased urine volume. Negative for difficulty urinating, dysuria and enuresis.   Musculoskeletal: Negative.  Negative for arthralgias, back pain and gait problem.   Skin: Negative.  Negative for pallor and rash.   Allergic/Immunologic: Negative.  Negative for environmental allergies and food allergies.    Neurological: Negative.  Negative for dizziness, facial asymmetry, speech difficulty, weakness and headaches.   Hematological: Negative.    Psychiatric/Behavioral: Negative.  Negative for agitation, behavioral problems and confusion.    Objective:     Vital Signs (Most Recent):  Temp: 98.3 °F (36.8 °C) (11/08/22 0022)  Pulse: 61 (11/08/22 0028)  Resp: 18 (11/07/22 2237)  BP: (!) 171/86 (11/08/22 0050)  SpO2: (!) 93 % (11/08/22 0022)   Vital Signs (24h Range):  Temp:  [98.2 °F (36.8 °C)-99.8 °F (37.7 °C)] 98.3 °F (36.8 °C)  Pulse:  [] 61  Resp:  [15-18] 18  SpO2:  [93 %-98 %] 93 %  BP: (167-185)/() 171/86     Weight: 122.5 kg (270 lb)  Body mass index is 42.29 kg/m².    Physical Exam  Vitals reviewed.   Constitutional:       General: He is not in acute distress.     Appearance: Normal appearance.   HENT:      Head: Normocephalic and atraumatic.      Mouth/Throat:      Mouth: Mucous membranes are moist.      Pharynx: Oropharynx is clear. No posterior oropharyngeal erythema.   Eyes:      Extraocular Movements: Extraocular movements intact.      Pupils: Pupils are equal, round, and reactive to light.   Cardiovascular:      Rate and Rhythm: Normal rate and regular rhythm.      Pulses: Normal pulses.      Heart sounds: Normal heart sounds. No murmur heard.  Pulmonary:      Effort: Pulmonary effort is normal. No respiratory distress.      Breath sounds: Normal breath sounds. No wheezing.   Abdominal:      General: Bowel sounds are normal. There is no distension.      Palpations: Abdomen is soft.      Tenderness: There is abdominal tenderness (lower abdomen). There is no guarding.   Musculoskeletal:         General: No swelling, tenderness or signs of injury. Normal range of motion.      Cervical back: Normal range of motion and neck supple. No rigidity or tenderness.      Right lower leg: Edema (2+) present.      Left lower leg: Edema (2+) present.   Skin:     General: Skin is dry.      Capillary Refill:  Capillary refill takes less than 2 seconds.      Comments: Scaly lower extremities   Neurological:      General: No focal deficit present.      Mental Status: He is alert and oriented to person, place, and time.      Cranial Nerves: No cranial nerve deficit.      Sensory: No sensory deficit.      Motor: No weakness.   Psychiatric:         Mood and Affect: Mood normal.         Behavior: Behavior normal.         CRANIAL NERVES     CN III, IV, VI   Pupils are equal, round, and reactive to light.     Significant Labs: All pertinent labs within the past 24 hours have been reviewed.  BMP:   Recent Labs   Lab 11/07/22 1941   GLU 80      K 2.3*   CL 96   CO2 36*   BUN 47*   CREATININE 3.4*   CALCIUM 9.2     CBC:   Recent Labs   Lab 11/07/22 1626   WBC 4.36   HGB 11.7*   HCT 36.1*   *     CMP:   Recent Labs   Lab 11/07/22  1626 11/07/22  1859 11/07/22 1941    141 144   K 2.6* 3.3* 2.3*   CL 93* 94* 96   CO2 34* 34* 36*   GLU 89 82 80   BUN 50* 48* 47*   CREATININE 3.7* 3.5* 3.4*   CALCIUM 9.8 9.4 9.2   PROT 7.9  --   --    ALBUMIN 3.6  --   --    BILITOT 0.9  --   --    ALKPHOS 87  --   --    AST 16  --   --    ALT 16  --   --    ANIONGAP 15 13 12     Troponin: No results for input(s): TROPONINI, TROPONINIHS in the last 48 hours.  TSH:   Recent Labs   Lab 11/07/22  2254   TSH 1.773     Urine Culture: No results for input(s): LABURIN in the last 48 hours.  Urine Studies:   Recent Labs   Lab 11/07/22  1621   COLORU Yellow   APPEARANCEUA Clear   PHUR 7.0   SPECGRAV 1.010   PROTEINUA Trace*   GLUCUA Negative   KETONESU Negative   BILIRUBINUA Negative   OCCULTUA Trace*   NITRITE Negative   UROBILINOGEN Negative   LEUKOCYTESUR Negative       Significant Imaging: I have reviewed all pertinent imaging results/findings within the past 24 hours.  X-Ray Knee 1 or 2 View Left  Narrative: EXAMINATION:  XR KNEE 1 OR 2 VIEW LEFT    CLINICAL HISTORY:  Pain in unspecified knee    COMPARISON:  None.    FINDINGS:  The  alignment is within normal limits.  No displaced fractures identified.  No evidence of lytic or blastic lesions.Joint spaces are unremarkable.Prepatellar soft tissue swelling potentially hematoma or prepatellar bursitis.  Impression: No evidence of fracture.Prepatellar soft tissue swelling may be that of hematoma or prepatellar bursitis.    Electronically signed by: Carmine Moura MD  Date:    06/04/2022  Time:    16:29    Assessment/Plan:     * KASHIF (acute kidney injury)  Hypokalemia  Chronic kidney disease stage 2  Urinary retention  Patient with a creatinine of 3.7, up from 3.2.  Baseline varies.   Potassium 2.6; baseline not much higher    -Previously seen by Dr Ríos- last visit in May  Per Dr Ríos Note:  Patient tried Bumex but did not feel like it was effective and Lasix 40 mg b.i.d. was restarted, patient rarely took metolazone, at 1 point creatinine had risen up to 5.89 with GFR 12.  Lasix was adjusted to 40 mg b.i.d. on Monday Wednesday and Friday with only 40 mg once daily the rest of the week; KCL 20 mEqs b.i.d. while diuresing    -supplemental potassium ordered p.r.n.  -kidney ultrasound ordered  -nephrology consulted  -patient began to produce urine while in the ED           Chronic atrial fibrillation  Continue Eliquis 2.5 mg b.i.d.      HTN (hypertension)  Uncontrolled.  Patient did not take any medications today and possibly not since last Thursday.  Will adjust blood pressure medications as needed.      Congestive heart failure (CHF)  Well compensated.  Chronic peripheral edema.  Patient is prescribed Lasix 40 mg b.i.d., metolazone 5 mg daily, and carvedilol 25 mg b.i.d.      VTE Risk Mitigation (From admission, onward)           Ordered     apixaban tablet 2.5 mg  2 times daily         11/07/22 2230                       Danisha Riggs DNP  Department of Hospital Medicine   [facility]

## 2022-11-08 NOTE — PLAN OF CARE
Problem: Adult Inpatient Plan of Care  Goal: Plan of Care Review  11/8/2022 0534 by Rashawn Barcenas RN  Outcome: Ongoing, Progressing  11/8/2022 0533 by Rashawn Barcenas RN  Outcome: Ongoing, Progressing  Goal: Optimal Comfort and Wellbeing  11/8/2022 0534 by Rashawn Barcensa RN  Outcome: Ongoing, Progressing  11/8/2022 0533 by Rashawn Barcenas RN  Outcome: Ongoing, Progressing     Problem: Fluid and Electrolyte Imbalance (Acute Kidney Injury/Impairment)  Goal: Fluid and Electrolyte Balance  11/8/2022 0534 by Rashawn Barcenas RN  Outcome: Ongoing, Progressing  11/8/2022 0533 by Rashawn Barcenas RN  Outcome: Ongoing, Progressing     Problem: Oral Intake Inadequate (Acute Kidney Injury/Impairment)  Goal: Optimal Nutrition Intake  Outcome: Ongoing, Progressing     Problem: Renal Function Impairment (Acute Kidney Injury/Impairment)  Goal: Effective Renal Function  11/8/2022 0534 by Rashawn Barcenas RN  Outcome: Ongoing, Progressing  11/8/2022 0533 by Rashawn Barcenas RN  Outcome: Ongoing, Progressing

## 2022-11-08 NOTE — ASSESSMENT & PLAN NOTE
Hypokalemia  Chronic kidney disease stage 2  Urinary retention  Patient with a creatinine of 3.7, up from 3.2.  Baseline varies.   Potassium 2.6; baseline not much higher    -Previously seen by Dr Ríos- last visit in May  Per Dr Ríos Note:  Patient tried Bumex but did not feel like it was effective and Lasix 40 mg b.i.d. was restarted, patient rarely took metolazone, at 1 point creatinine had risen up to 5.89 with GFR 12.  Lasix was adjusted to 40 mg b.i.d. on Monday Wednesday and Friday with only 40 mg once daily the rest of the week; KCL 20 mEqs b.i.d. while diuresing    -supplemental potassium ordered p.r.n.  -kidney ultrasound ordered  -nephrology consulted  -patient began to produce urine while in the ED

## 2022-11-09 VITALS
BODY MASS INDEX: 42.38 KG/M2 | DIASTOLIC BLOOD PRESSURE: 80 MMHG | HEART RATE: 64 BPM | RESPIRATION RATE: 18 BRPM | HEIGHT: 67 IN | OXYGEN SATURATION: 98 % | WEIGHT: 270 LBS | SYSTOLIC BLOOD PRESSURE: 142 MMHG | TEMPERATURE: 98 F

## 2022-11-09 LAB
ALBUMIN SERPL BCP-MCNC: 3.4 G/DL (ref 3.5–5.2)
ALP SERPL-CCNC: 88 U/L (ref 55–135)
ALT SERPL W/O P-5'-P-CCNC: 13 U/L (ref 10–44)
ANION GAP SERPL CALC-SCNC: 11 MMOL/L (ref 8–16)
ANISOCYTOSIS BLD QL SMEAR: SLIGHT
AST SERPL-CCNC: 12 U/L (ref 10–40)
BASOPHILS # BLD AUTO: 0.03 K/UL (ref 0–0.2)
BASOPHILS NFR BLD: 0.7 % (ref 0–1.9)
BILIRUB SERPL-MCNC: 0.7 MG/DL (ref 0.1–1)
BUN SERPL-MCNC: 51 MG/DL (ref 6–20)
CALCIUM SERPL-MCNC: 9.4 MG/DL (ref 8.7–10.5)
CHLORIDE SERPL-SCNC: 98 MMOL/L (ref 95–110)
CO2 SERPL-SCNC: 34 MMOL/L (ref 23–29)
CREAT SERPL-MCNC: 3.5 MG/DL (ref 0.5–1.4)
DIFFERENTIAL METHOD: ABNORMAL
EOSINOPHIL # BLD AUTO: 0.2 K/UL (ref 0–0.5)
EOSINOPHIL NFR BLD: 3.6 % (ref 0–8)
ERYTHROCYTE [DISTWIDTH] IN BLOOD BY AUTOMATED COUNT: 19.2 % (ref 11.5–14.5)
EST. GFR  (NO RACE VARIABLE): 20 ML/MIN/1.73 M^2
GLUCOSE SERPL-MCNC: 88 MG/DL (ref 70–110)
HCT VFR BLD AUTO: 36.8 % (ref 40–54)
HGB BLD-MCNC: 11.5 G/DL (ref 14–18)
HYPOCHROMIA BLD QL SMEAR: ABNORMAL
IMM GRANULOCYTES # BLD AUTO: 0.01 K/UL (ref 0–0.04)
IMM GRANULOCYTES NFR BLD AUTO: 0.2 % (ref 0–0.5)
LYMPHOCYTES # BLD AUTO: 1.5 K/UL (ref 1–4.8)
LYMPHOCYTES NFR BLD: 34.3 % (ref 18–48)
MCH RBC QN AUTO: 26 PG (ref 27–31)
MCHC RBC AUTO-ENTMCNC: 31.3 G/DL (ref 32–36)
MCV RBC AUTO: 83 FL (ref 82–98)
MONOCYTES # BLD AUTO: 0.5 K/UL (ref 0.3–1)
MONOCYTES NFR BLD: 11 % (ref 4–15)
NEUTROPHILS # BLD AUTO: 2.2 K/UL (ref 1.8–7.7)
NEUTROPHILS NFR BLD: 50.2 % (ref 38–73)
NRBC BLD-RTO: 0 /100 WBC
PLATELET # BLD AUTO: 117 K/UL (ref 150–450)
PLATELET BLD QL SMEAR: ABNORMAL
PMV BLD AUTO: ABNORMAL FL (ref 9.2–12.9)
POIKILOCYTOSIS BLD QL SMEAR: SLIGHT
POTASSIUM SERPL-SCNC: 3.5 MMOL/L (ref 3.5–5.1)
PROT SERPL-MCNC: 7.4 G/DL (ref 6–8.4)
RBC # BLD AUTO: 4.42 M/UL (ref 4.6–6.2)
SODIUM SERPL-SCNC: 143 MMOL/L (ref 136–145)
WBC # BLD AUTO: 4.46 K/UL (ref 3.9–12.7)

## 2022-11-09 PROCEDURE — 85025 COMPLETE CBC W/AUTO DIFF WBC: CPT | Performed by: NURSE PRACTITIONER

## 2022-11-09 PROCEDURE — 36415 COLL VENOUS BLD VENIPUNCTURE: CPT | Performed by: NURSE PRACTITIONER

## 2022-11-09 PROCEDURE — 25000003 PHARM REV CODE 250: Performed by: NURSE PRACTITIONER

## 2022-11-09 PROCEDURE — 99239 HOSP IP/OBS DSCHRG MGMT >30: CPT | Mod: ,,, | Performed by: PHYSICIAN ASSISTANT

## 2022-11-09 PROCEDURE — 99239 PR HOSPITAL DISCHARGE DAY,>30 MIN: ICD-10-PCS | Mod: ,,, | Performed by: PHYSICIAN ASSISTANT

## 2022-11-09 PROCEDURE — 80053 COMPREHEN METABOLIC PANEL: CPT | Performed by: NURSE PRACTITIONER

## 2022-11-09 RX ORDER — TAMSULOSIN HYDROCHLORIDE 0.4 MG/1
0.4 CAPSULE ORAL DAILY
Status: DISCONTINUED | OUTPATIENT
Start: 2022-11-09 | End: 2022-11-09 | Stop reason: HOSPADM

## 2022-11-09 RX ORDER — POTASSIUM CHLORIDE 20 MEQ/1
40 TABLET, EXTENDED RELEASE ORAL 2 TIMES DAILY
Qty: 60 TABLET | Refills: 0 | Status: SHIPPED | OUTPATIENT
Start: 2022-11-09

## 2022-11-09 RX ORDER — POTASSIUM CHLORIDE 20 MEQ/1
40 TABLET, EXTENDED RELEASE ORAL 2 TIMES DAILY
Status: DISCONTINUED | OUTPATIENT
Start: 2022-11-09 | End: 2022-11-09 | Stop reason: HOSPADM

## 2022-11-09 RX ORDER — TAMSULOSIN HYDROCHLORIDE 0.4 MG/1
0.4 CAPSULE ORAL DAILY
Qty: 30 CAPSULE | Refills: 3 | Status: SHIPPED | OUTPATIENT
Start: 2022-11-10 | End: 2023-11-10

## 2022-11-09 RX ADMIN — FUROSEMIDE 40 MG: 40 TABLET ORAL at 08:11

## 2022-11-09 RX ADMIN — POTASSIUM CHLORIDE 40 MEQ: 1500 TABLET, EXTENDED RELEASE ORAL at 08:11

## 2022-11-09 RX ADMIN — TAMSULOSIN HYDROCHLORIDE 0.4 MG: 0.4 CAPSULE ORAL at 10:11

## 2022-11-09 RX ADMIN — AMLODIPINE BESYLATE 5 MG: 5 TABLET ORAL at 08:11

## 2022-11-09 RX ADMIN — ALLOPURINOL 200 MG: 100 TABLET ORAL at 08:11

## 2022-11-09 RX ADMIN — APIXABAN 2.5 MG: 2.5 TABLET, FILM COATED ORAL at 08:11

## 2022-11-09 RX ADMIN — CARVEDILOL 25 MG: 12.5 TABLET, FILM COATED ORAL at 08:11

## 2022-11-09 NOTE — ASSESSMENT & PLAN NOTE
Hypokalemia  Chronic kidney disease stage 2  Urinary retention  Patient with a creatinine of 3.7, up from 3.2.  Baseline varies.   Potassium 2.6; baseline not much higher    -Previously seen by Dr Ríos- last visit in May  Per Dr Ríos Note:  Patient tried Bumex but did not feel like it was effective and Lasix 40 mg b.i.d. was restarted, patient rarely took metolazone, at 1 point creatinine had risen up to 5.89 with GFR 12.  Lasix was adjusted to 40 mg b.i.d. on Monday Wednesday and Friday with only 40 mg once daily the rest of the week; KCL 20 mEqs b.i.d. while diuresing    -supplemental potassium ordered p.r.n.  -kidney ultrasound ordered: no acute process  -nephrology consulted:  - Hold Lisinopril while diuresing.  BB/CCB for now  - He didn't feel he diuresed with Bumex so put himself back on Lasix 40mg bid. Takes Metolazone rarely.  - Have gone over diet excessively and ways to avoid hidden salt.  -continue lasix but daily: dc with lasix 40 daily and K 40 BID  -patient began to produce urine while in the ED          No

## 2022-11-09 NOTE — PROGRESS NOTES
"Nephrology  Progress Note    Admit Date: 11/7/2022   LOS: 1 day     SUBJECTIVE:     Follow-up For:  KASHIF (acute kidney injury)  History of Present Illness:  Patient is a 57 y.o. male presents with difficulty voiding despite lasix and found to have hypokalemia/KASHIF.  Felt that he has been needing to push urine out and getting stuck.  Pt known to us from clinic and formerly followed Dr. Sharma and now Dr. Kelsey with advanced CKD IV (creat 3-3.2 but last was 5.9 in May) from HTN/CHF/Diuretics.  Admitted for K replacement (K 2.3) and KASHIF eval.  Consulted and pt seen/examined this am.  Feels much better and able to void.  Creat near baseline and K 2.9.  pt states that his  recent Rx was for K was not correct.  Denies NSAIDs.  Awaiting US/bladder scan..       Epic reviewed.        Interval History:     much improved and no c/o.  Eager to go home.  Labs/UOP much improved.  Discussed with team.     Review of Systems:  Constitutional: No fever or chills  Respiratory: No cough or shortness of breath  Cardiovascular: No chest pain or palpitations  Gastrointestinal: No nausea or vomiting  Neurological: No confusion or weakness    OBJECTIVE:     Vital Signs Range (Last 24H):  BP (!) 142/80 (BP Location: Right arm, Patient Position: Sitting)   Pulse 68   Temp 98 °F (36.7 °C) (Oral)   Resp 18   Ht 5' 7" (1.702 m)   Wt 122.5 kg (270 lb)   SpO2 98%   BMI 42.29 kg/m²     Temp:  [97.7 °F (36.5 °C)-98.1 °F (36.7 °C)]   Pulse:  [48-80]   Resp:  [18-20]   BP: (122-168)/()   SpO2:  [94 %-98 %]     I & O (Last 24H):  Intake/Output Summary (Last 24 hours) at 11/9/2022 0962  Last data filed at 11/9/2022 0548  Gross per 24 hour   Intake 480 ml   Output 825 ml   Net -345 ml       Physical Exam:  General appearance: Well developed, well nourished  Eyes:  Conjunctivae/corneas clear. PERRL.  Lungs: Normal respiratory effort,   clear to auscultation bilaterally   Heart: Regular rate and rhythm, S1, S2 normal, no murmur, rub or " darrius.  Abdomen: Soft, non-tender non-distended; bowel sounds normal; no masses,  no organomegaly, obese  Extremities: No cyanosis or clubbing. trace+ edema.    Skin: Skin color, texture, turgor normal. No rashes or lesions  Neurologic: Normal strength and tone. No focal numbness or weakness     Laboratory Data:  Recent Labs   Lab 11/09/22  0512   WBC 4.46   RBC 4.42*   HGB 11.5*   HCT 36.8*   *   MCV 83   MCH 26.0*   MCHC 31.3*       BMP:   Recent Labs   Lab 11/08/22  0508 11/08/22  1343 11/09/22  0512   GLU 89   < > 88      < > 143   K 2.9*   < > 3.5   CL 94*   < > 98   CO2 33*   < > 34*   BUN 46*   < > 51*   CREATININE 3.3*   < > 3.5*   CALCIUM 9.7   < > 9.4   MG 1.7  --   --     < > = values in this interval not displayed.     Lab Results   Component Value Date    CALCIUM 9.4 11/09/2022    PHOS 3.2 11/08/2022       Lab Results   Component Value Date     (H) 03/26/2012    CALCIUM 9.4 11/09/2022    PHOS 3.2 11/08/2022       Lab Results   Component Value Date    URICACID 12.8 (H) 03/26/2012       BNP  No results for input(s): BNP, BNPTRIAGEBLO in the last 168 hours.    Medications:  Medication list was reviewed and changes noted under Assessment/Plan.    Diagnostic Results:    US Retroperitoneal Complete   Final Result      Chronic medical renal disease with no acute process seen.      Prostatomegaly.         Electronically signed by: Marleni Phoenix   Date:    11/08/2022   Time:    11:35          ASSESSMENT/PLAN:     Resolving Hypertensive heart with chronic systolic heart failure and acute kidney injury on CKD Stage 4/A3  - echo with EF 45%, severe pulm HTN (PAP 72 mmHg).  - Followed by Dr. Sethi.  - Has been adherent to low sodium diet, avoiding fast foods and canned foods.  - Stressed again to avoid all salt and discussed how processed and pre-packaged foods often have more salt intake that is realized.  - Hold Lisinopril while diuresing but can resume.   - He didn't feel he diuresed with  Bumex so put himself back on Lasix 40mg bid. Takes Metolazone rarely.  - Have gone over diet excessively and ways to avoid hidden salt.  - Has compression stockings.  - weighs daily at home.  - Recent Cr 3.1-3.2 but Cr cami to 5.89 (eGFR 12) in May. Adjusted Lasix to 40mg bid on MWF and rest of the week only take 40mg once daily.  -creat now at baseline 3.4ish.   -continue lasix but daily.  -will need AVF at outpt to prep for RRT in future based on trends.  -f/up with Dr. Kelsey.  -US with prostamegaly and partial bladder distention:  start flomax.   -Renally dose meds, avoid nephrotoxins, and monitor I/O's closely.       Hypokalemia  - Due to diuretics.  - aggressive PO/IV replacement  - mag wnl  -stable.      Metabolic alkalosis   - Likely metabolic compensation from underlying QI/respiratory acidosis.  - Completed Diamox 250mg bid x2 weeks during prior hospitalization     Vitamin D deficiency  - Vitamin D and iPTH acceptable with D3 2000 units daily.     Hyperuricemia  - Continue allopurinol     Morbid obesity  - Discussed adherence to low salt diet.  - Applauded recent exercising--challenged him to increase walking to 30 min/day.            Ok to DC    Lasix 40 daily  Kdur 40 BID  Flomax 0.4  Lisinopril as outpt    F/up with Dr. Kelsey as scheduled.

## 2022-11-09 NOTE — PLAN OF CARE
11/09/22 1035   Final Note   Assessment Type Final Discharge Note   Anticipated Discharge Disposition Home   Hospital Resources/Appts/Education Provided Provided patient/caregiver with written discharge plan information;Appointments scheduled and added to AVS;Appointments scheduled by Navigator/Coordinator   Post-Acute Status   Discharge Delays None known at this time       Pt states he lives independently at home.     Family to provide transportation home.    No DC needs from CM perspective.

## 2022-11-10 ENCOUNTER — PATIENT OUTREACH (OUTPATIENT)
Dept: ADMINISTRATIVE | Facility: CLINIC | Age: 57
End: 2022-11-10
Payer: MEDICAID

## 2022-11-10 NOTE — PROGRESS NOTES
C3 nurse attempted to contact Jonas Beltre for a TCC post hospital discharge follow up call. No answer. Left voicemail with callback information. The patient does not have a scheduled HOSFU appointment. Message sent to PCP staff for assistance with scheduling visit with patient.

## 2022-11-11 NOTE — PROGRESS NOTES
C3 nurse 2nd attempt to contact Jonas Beltre for a TCC post hospital discharge follow up call. No answer. No voicemail. The patient does not have a scheduled HOSFU appointment within 5-7 days of discharge. Message previously sent to PCP staff for assistance with scheduling visit with patient.    Appt noted 11/12/22@[id number].

## 2024-03-06 ENCOUNTER — DOCUMENTATION ONLY (OUTPATIENT)
Dept: TRANSPLANT | Facility: CLINIC | Age: 59
End: 2024-03-06
Payer: MEDICAID

## 2024-03-06 ENCOUNTER — TELEPHONE (OUTPATIENT)
Dept: TRANSPLANT | Facility: CLINIC | Age: 59
End: 2024-03-06
Payer: MEDICAID

## 2025-05-23 ENCOUNTER — PATIENT MESSAGE (OUTPATIENT)
Dept: CARDIOLOGY | Facility: OTHER | Age: 60
End: 2025-05-23
Payer: MEDICAID

## 2025-06-04 ENCOUNTER — PATIENT MESSAGE (OUTPATIENT)
Dept: CARDIOLOGY | Facility: OTHER | Age: 60
End: 2025-06-04
Payer: MEDICAID

## 2025-06-06 ENCOUNTER — HOSPITAL ENCOUNTER (OUTPATIENT)
Facility: OTHER | Age: 60
Discharge: HOME | End: 2025-06-06
Attending: STUDENT IN AN ORGANIZED HEALTH CARE EDUCATION/TRAINING PROGRAM | Admitting: STUDENT IN AN ORGANIZED HEALTH CARE EDUCATION/TRAINING PROGRAM
Payer: MEDICAID

## 2025-06-06 VITALS
SYSTOLIC BLOOD PRESSURE: 100 MMHG | OXYGEN SATURATION: 100 % | DIASTOLIC BLOOD PRESSURE: 59 MMHG | RESPIRATION RATE: 16 BRPM | HEART RATE: 82 BPM | TEMPERATURE: 99 F | BODY MASS INDEX: 33.43 KG/M2 | WEIGHT: 213 LBS | HEIGHT: 67 IN

## 2025-06-06 DIAGNOSIS — N18.6 END STAGE RENAL DISEASE: ICD-10-CM

## 2025-06-06 PROCEDURE — 63600175 PHARM REV CODE 636 W HCPCS: Performed by: STUDENT IN AN ORGANIZED HEALTH CARE EDUCATION/TRAINING PROGRAM

## 2025-06-06 RX ORDER — SODIUM ZIRCONIUM CYCLOSILICATE 10 G/10G
10 POWDER, FOR SUSPENSION ORAL
COMMUNITY
Start: 2025-05-06

## 2025-06-06 RX ORDER — SEVELAMER CARBONATE 800 MG/1
1600 TABLET, FILM COATED ORAL 3 TIMES DAILY
COMMUNITY

## 2025-06-06 RX ORDER — LIDOCAINE HYDROCHLORIDE 10 MG/ML
INJECTION, SOLUTION INFILTRATION; PERINEURAL
Status: DISCONTINUED | OUTPATIENT
Start: 2025-06-06 | End: 2025-06-06 | Stop reason: HOSPADM

## 2025-06-06 NOTE — Clinical Note
The site was marked. The right chest was prepped. The site was prepped with ChloraPrep. The patient was draped.

## 2025-06-06 NOTE — H&P
Interventional Radiology Pre-Procedure History & Physical      Chief Complaint/Reason for Referral: indwelling lien    History of Present Illness:  Jonas Beltre Jr. is a 59 y.o. male who presents for line removal    Past Medical History:   Diagnosis Date    Anticoagulant long-term use     CHF (congestive heart failure)     Gout     Hypertension     Malignant hypertensive heart and chronic kidney disease stage IV     Renal disorder      History reviewed. No pertinent surgical history.    Allergies:   Review of patient's allergies indicates:  No Known Allergies     Home Meds:   Prior to Admission medications    Medication Sig Start Date End Date Taking? Authorizing Provider   apixaban (ELIQUIS) 2.5 mg Tab Take 2.5 mg by mouth 2 (two) times daily.   Yes Provider, Historical   LOKELMA 10 gram packet Take 10 g by mouth. 5/6/25  Yes Provider, Historical   sevelamer carbonate (RENVELA) 800 mg Tab Take 1,600 mg by mouth 3 (three) times daily.   Yes Provider, Historical   allopurinol (ZYLOPRIM) 300 MG tablet Take 300 mg by mouth once daily.    Provider, Historical   carvedilol (COREG) 25 MG tablet Take 1 tablet (25 mg total) by mouth 2 (two) times daily with meals. 6/9/14 11/7/22  Kim Guillen, NP   fish oil-omega-3 fatty acids 300-1,000 mg capsule Take 2 g by mouth once daily.    Provider, Historical   furosemide (LASIX) 40 MG tablet Take 1 tablet (40 mg total) by mouth once daily. 6/9/14   Kim Guillen NP   metOLazone (ZAROXOLYN) 5 MG tablet Take 5 mg by mouth once daily. 10/19/22   Provider, Historical   potassium chloride SA (K-DUR,KLOR-CON) 20 MEQ tablet Take 2 tablets (40 mEq total) by mouth 2 (two) times daily. 11/9/22   Prudence Norris PA-C   tamsulosin (FLOMAX) 0.4 mg Cap Take 1 capsule (0.4 mg total) by mouth once daily. 11/10/22 11/10/23  Prudence Norris PA-C       Anticoagulation/Antiplatelet Meds: as above    Review of Systems:   Hematological: no known coagulopathies  Respiratory: no shortness of  breath  Cardiovascular: no chest pain  Gastrointestinal: no abdominal pain  Genitourinary: no dysuria  Musculoskeletal: negative  Neurological: no TIA or stroke symptoms     Physical Exam:  Temp: 98.8 °F (37.1 °C) (06/06/25 1314)  Pulse: 70 (06/06/25 1314)  Resp: 16 (06/06/25 1314)  BP: (!) 106/56 (06/06/25 1314)  SpO2: 98 % (06/06/25 1314)    General: WNWD, NAD  HEENT: Normocephalic, sclera anicteric,  Neck: Supple, no palpable lymphadenopathy  Heart: RRR  Chest: right chest wall line in place  Lungs:  breathing unlabored  Abd: NTND, soft  Extremities:  no CCE on exposed skin  Neuro: Gross nonfocal    Laboratory:  Lab Results   Component Value Date    INR 1.2 01/28/2025       Lab Results   Component Value Date    WBC 4.46 11/09/2022    HGB 11.5 (L) 11/09/2022    HCT 36.8 (L) 11/09/2022    MCV 83 11/09/2022     (L) 11/09/2022      Lab Results   Component Value Date    GLU 81 04/24/2023     04/02/2025    K 3.8 04/02/2025    CL 98 11/09/2022    CO2 20 04/02/2025    BUN 62.1 (H) 04/02/2025    CREATININE 13.95 (H) 04/02/2025    CALCIUM 7.9 (L) 04/02/2025    MG 1.7 11/08/2022    ALT 21 03/20/2025    AST 17 03/20/2025    ALBUMIN 4.5 03/20/2025    BILITOT 0.3 03/20/2025       Imaging:  No recent available      Assessment/Plan:  59 y.o. male with indwelling line now with working fistula. Will undergo tunneled dialysis line removal today.    Sedation:  local    Risks (including, but not limited to, pain, bleeding, infection, damage to nearby structures, treatment failure/recurrence, and the need for additional procedures), potential benefits, and alternatives were discussed with the patient. All questions were answered to the best of my abilities. The patient wishes to proceed. Written informed consent was obtained.      Kayley Molina MD

## 2025-06-06 NOTE — BRIEF OP NOTE
Radiology Post-Procedure Note    Pre Op Diagnosis: renal failure    Post Op Diagnosis: same    Procedure: tunneled line removal     Procedure performed by: Kayley Molina MD    Written Informed Consent Obtained: Yes    Specimen Removed: YES  tunneled line    Estimated Blood Loss: Minimal    Findings:   Right Ij tunneled line removal    Patient tolerated procedure well.    Kayley Molina MD  Interventional Radiology

## 2025-06-06 NOTE — DISCHARGE INSTRUCTIONS
Central Venous Catheter Removal Discharge Instructions      Information:    A central venous catheter was removed.        What should I expect after the Central venous catheter removal?         There may be some soreness around the access site.     You may return to work unless your primary doctor instructs you otherwise.     You do not have any diet restrictions because of this procedure but should continue any that were given to you by any other doctors.     Continue all previously prescribed medications.       Can I shower?     Bandage may be removed at 48 hours and you can shower as normal after that.    Do not submerge under water for 5 days or until area is completely healed      Follow-up visit information:     It is important to follow up with you primary care provider as well as the provider that sent you to IR.       Occasionally, a situation will require prompt attention, and an emergency room visit is necessary:     Sudden chest pain, shortness of breath, or fainting     Increasing redness, swelling or drainage from the biopsy site     Increasing pain not relieved by medication     Bleeding or drainage from the needle site that is saturating the dressing     You have shaking, chills and/or a temperature over 100.3°F     New, sudden difficulty breathing     Drop in blood pressure, and/or light-headed feeling       Interventional Radiology [facility]   For complications   [phone]. Monday - Friday, 8:00 am - 4:00 pm    [phone] After hours and on holidays. Ask to speak with the interventional radiologist on call.     For Scheduling   [phone] Monday - Friday, 8:00 am - 4:00 pm

## (undated) DEVICE — TOWEL OR DISP STRL BLUE 4/PK

## (undated) DEVICE — ADHESIVE DERMABOND ADVANCED

## (undated) DEVICE — DRAPE THREE-QTR REINF 53X77IN

## (undated) DEVICE — TRAY SUT REM SCISSOR FORCEP

## (undated) DEVICE — CONTAINER SPECIMEN OR STER 4OZ